# Patient Record
Sex: MALE
[De-identification: names, ages, dates, MRNs, and addresses within clinical notes are randomized per-mention and may not be internally consistent; named-entity substitution may affect disease eponyms.]

---

## 2023-11-21 ENCOUNTER — APPOINTMENT (OUTPATIENT)
Dept: MRI IMAGING | Facility: CLINIC | Age: 28
End: 2023-11-21

## 2023-12-15 ENCOUNTER — APPOINTMENT (OUTPATIENT)
Dept: MRI IMAGING | Facility: CLINIC | Age: 28
End: 2023-12-15

## 2023-12-15 ENCOUNTER — APPOINTMENT (OUTPATIENT)
Dept: ULTRASOUND IMAGING | Facility: CLINIC | Age: 28
End: 2023-12-15

## 2023-12-27 ENCOUNTER — NON-APPOINTMENT (OUTPATIENT)
Age: 28
End: 2023-12-27

## 2023-12-27 ENCOUNTER — APPOINTMENT (OUTPATIENT)
Dept: UROLOGY | Facility: CLINIC | Age: 28
End: 2023-12-27
Payer: COMMERCIAL

## 2023-12-27 VITALS
OXYGEN SATURATION: 98 % | DIASTOLIC BLOOD PRESSURE: 74 MMHG | BODY MASS INDEX: 29.89 KG/M2 | TEMPERATURE: 97.3 F | WEIGHT: 186 LBS | RESPIRATION RATE: 16 BRPM | HEIGHT: 66 IN | SYSTOLIC BLOOD PRESSURE: 123 MMHG | HEART RATE: 87 BPM

## 2023-12-27 DIAGNOSIS — Z82.49 FAMILY HISTORY OF ISCHEMIC HEART DISEASE AND OTHER DISEASES OF THE CIRCULATORY SYSTEM: ICD-10-CM

## 2023-12-27 DIAGNOSIS — Z80.1 FAMILY HISTORY OF MALIGNANT NEOPLASM OF TRACHEA, BRONCHUS AND LUNG: ICD-10-CM

## 2023-12-27 DIAGNOSIS — F32.89 OTHER SPECIFIED DEPRESSIVE EPISODES: ICD-10-CM

## 2023-12-27 DIAGNOSIS — F90.9 ATTENTION-DEFICIT HYPERACTIVITY DISORDER, UNSPECIFIED TYPE: ICD-10-CM

## 2023-12-27 DIAGNOSIS — Z83.3 FAMILY HISTORY OF DIABETES MELLITUS: ICD-10-CM

## 2023-12-27 DIAGNOSIS — Z80.0 FAMILY HISTORY OF MALIGNANT NEOPLASM OF DIGESTIVE ORGANS: ICD-10-CM

## 2023-12-27 DIAGNOSIS — Z83.511 FAMILY HISTORY OF GLAUCOMA: ICD-10-CM

## 2023-12-27 DIAGNOSIS — N48.30 PRIAPISM, UNSPECIFIED: ICD-10-CM

## 2023-12-27 PROCEDURE — 99203 OFFICE O/P NEW LOW 30 MIN: CPT

## 2023-12-27 RX ORDER — BUPROPION HYDROCHLORIDE 300 MG/1
300 TABLET, EXTENDED RELEASE ORAL
Refills: 0 | Status: ACTIVE | COMMUNITY

## 2023-12-27 RX ORDER — SERTRALINE HYDROCHLORIDE 50 MG/1
50 TABLET, FILM COATED ORAL
Refills: 0 | Status: ACTIVE | COMMUNITY

## 2023-12-27 NOTE — HISTORY OF PRESENT ILLNESS
[FreeTextEntry1] : 27yo M presents after 2 episodes of painful erection over last month He states first time he was awaken from sleep with painful erection. He states last painful erection lasted around 3 hours. Even ejaculation didn't resolve the issue. He has been fine since last week He denies taking any otc medication or drugs.

## 2023-12-27 NOTE — ASSESSMENT
[FreeTextEntry1] : Painful erection ?priapism discussed if it recurs, will refer patient to Dr. Mahan for penile duplex US for possible fistula

## 2024-01-23 ENCOUNTER — APPOINTMENT (OUTPATIENT)
Dept: OPHTHALMOLOGY | Facility: CLINIC | Age: 29
End: 2024-01-23
Payer: COMMERCIAL

## 2024-01-23 ENCOUNTER — INPATIENT (INPATIENT)
Facility: HOSPITAL | Age: 29
LOS: 4 days | Discharge: HOME | DRG: 841 | End: 2024-01-28
Attending: STUDENT IN AN ORGANIZED HEALTH CARE EDUCATION/TRAINING PROGRAM | Admitting: SPECIALIST
Payer: COMMERCIAL

## 2024-01-23 ENCOUNTER — NON-APPOINTMENT (OUTPATIENT)
Age: 29
End: 2024-01-23

## 2024-01-23 VITALS
HEIGHT: 67 IN | HEART RATE: 96 BPM | OXYGEN SATURATION: 95 % | WEIGHT: 188.05 LBS | DIASTOLIC BLOOD PRESSURE: 64 MMHG | TEMPERATURE: 98 F | RESPIRATION RATE: 16 BRPM | SYSTOLIC BLOOD PRESSURE: 116 MMHG

## 2024-01-23 PROCEDURE — 92134 CPTRZ OPH DX IMG PST SGM RTA: CPT

## 2024-01-23 PROCEDURE — 99053 MED SERV 10PM-8AM 24 HR FAC: CPT

## 2024-01-23 PROCEDURE — 99291 CRITICAL CARE FIRST HOUR: CPT

## 2024-01-23 PROCEDURE — 99205 OFFICE O/P NEW HI 60 MIN: CPT

## 2024-01-24 ENCOUNTER — RESULT REVIEW (OUTPATIENT)
Age: 29
End: 2024-01-24

## 2024-01-24 DIAGNOSIS — C92.10 CHRONIC MYELOID LEUKEMIA, BCR/ABL-POSITIVE, NOT HAVING ACHIEVED REMISSION: ICD-10-CM

## 2024-01-24 LAB
ALBUMIN SERPL ELPH-MCNC: 3.7 G/DL — SIGNIFICANT CHANGE UP (ref 3.3–5)
ALBUMIN SERPL ELPH-MCNC: 3.8 G/DL — SIGNIFICANT CHANGE UP (ref 3.3–5)
ALBUMIN SERPL ELPH-MCNC: 3.9 G/DL — SIGNIFICANT CHANGE UP (ref 3.3–5)
ALBUMIN SERPL ELPH-MCNC: 3.9 G/DL — SIGNIFICANT CHANGE UP (ref 3.3–5)
ALBUMIN SERPL ELPH-MCNC: 4 G/DL — SIGNIFICANT CHANGE UP (ref 3.3–5)
ALBUMIN SERPL ELPH-MCNC: 4.1 G/DL — SIGNIFICANT CHANGE UP (ref 3.3–5)
ALP SERPL-CCNC: 61 U/L — SIGNIFICANT CHANGE UP (ref 40–120)
ALP SERPL-CCNC: 62 U/L — SIGNIFICANT CHANGE UP (ref 40–120)
ALP SERPL-CCNC: 68 U/L — SIGNIFICANT CHANGE UP (ref 40–120)
ALP SERPL-CCNC: 81 U/L — SIGNIFICANT CHANGE UP (ref 40–120)
ALP SERPL-CCNC: 86 U/L — SIGNIFICANT CHANGE UP (ref 40–120)
ALP SERPL-CCNC: 86 U/L — SIGNIFICANT CHANGE UP (ref 40–120)
ALT FLD-CCNC: 10 U/L — SIGNIFICANT CHANGE UP (ref 10–45)
ALT FLD-CCNC: 13 U/L — SIGNIFICANT CHANGE UP (ref 10–45)
ALT FLD-CCNC: 14 U/L — SIGNIFICANT CHANGE UP (ref 10–45)
ALT FLD-CCNC: 14 U/L — SIGNIFICANT CHANGE UP (ref 10–45)
ANION GAP SERPL CALC-SCNC: 11 MMOL/L — SIGNIFICANT CHANGE UP (ref 5–17)
ANION GAP SERPL CALC-SCNC: 13 MMOL/L — SIGNIFICANT CHANGE UP (ref 5–17)
ANION GAP SERPL CALC-SCNC: 14 MMOL/L — SIGNIFICANT CHANGE UP (ref 5–17)
ANION GAP SERPL CALC-SCNC: 14 MMOL/L — SIGNIFICANT CHANGE UP (ref 5–17)
ANION GAP SERPL CALC-SCNC: 16 MMOL/L — SIGNIFICANT CHANGE UP (ref 5–17)
ANION GAP SERPL CALC-SCNC: 8 MMOL/L — SIGNIFICANT CHANGE UP (ref 5–17)
ANISOCYTOSIS BLD QL: SLIGHT — SIGNIFICANT CHANGE UP
APTT BLD: 25.8 SEC — SIGNIFICANT CHANGE UP (ref 24.5–35.6)
APTT BLD: 26.9 SEC — SIGNIFICANT CHANGE UP (ref 24.5–35.6)
APTT BLD: 27 SEC — SIGNIFICANT CHANGE UP (ref 24.5–35.6)
APTT BLD: 27.2 SEC — SIGNIFICANT CHANGE UP (ref 24.5–35.6)
APTT BLD: 28.9 SEC — SIGNIFICANT CHANGE UP (ref 24.5–35.6)
AST SERPL-CCNC: 17 U/L — SIGNIFICANT CHANGE UP (ref 10–40)
AST SERPL-CCNC: 20 U/L — SIGNIFICANT CHANGE UP (ref 10–40)
AST SERPL-CCNC: 21 U/L — SIGNIFICANT CHANGE UP (ref 10–40)
AST SERPL-CCNC: 21 U/L — SIGNIFICANT CHANGE UP (ref 10–40)
AST SERPL-CCNC: 23 U/L — SIGNIFICANT CHANGE UP (ref 10–40)
AST SERPL-CCNC: 27 U/L — SIGNIFICANT CHANGE UP (ref 10–40)
BASE EXCESS BLDV CALC-SCNC: -0.4 MMOL/L — SIGNIFICANT CHANGE UP (ref -2–3)
BASE EXCESS BLDV CALC-SCNC: 0.4 MMOL/L — SIGNIFICANT CHANGE UP (ref -2–3)
BASE EXCESS BLDV CALC-SCNC: 2 MMOL/L — SIGNIFICANT CHANGE UP (ref -2–3)
BASE EXCESS BLDV CALC-SCNC: 2.6 MMOL/L — SIGNIFICANT CHANGE UP (ref -2–3)
BASOPHILS # BLD AUTO: 11.04 K/UL — HIGH (ref 0–0.2)
BASOPHILS # BLD AUTO: 2.83 K/UL — HIGH (ref 0–0.2)
BASOPHILS # BLD AUTO: 34.49 K/UL — HIGH (ref 0–0.2)
BASOPHILS # BLD AUTO: 5.34 K/UL — HIGH (ref 0–0.2)
BASOPHILS # BLD AUTO: 6.01 K/UL — HIGH (ref 0–0.2)
BASOPHILS NFR BLD AUTO: 1 % — SIGNIFICANT CHANGE UP (ref 0–2)
BASOPHILS NFR BLD AUTO: 10 % — HIGH (ref 0–2)
BASOPHILS NFR BLD AUTO: 2.3 % — HIGH (ref 0–2)
BASOPHILS NFR BLD AUTO: 2.7 % — HIGH (ref 0–2)
BASOPHILS NFR BLD AUTO: 3.3 % — HIGH (ref 0–2)
BILIRUB SERPL-MCNC: 0.2 MG/DL — SIGNIFICANT CHANGE UP (ref 0.2–1.2)
BILIRUB SERPL-MCNC: 0.3 MG/DL — SIGNIFICANT CHANGE UP (ref 0.2–1.2)
BILIRUB SERPL-MCNC: 0.3 MG/DL — SIGNIFICANT CHANGE UP (ref 0.2–1.2)
BILIRUB SERPL-MCNC: 0.4 MG/DL — SIGNIFICANT CHANGE UP (ref 0.2–1.2)
BLASTS # FLD: 2 % — HIGH (ref 0–0)
BLASTS # FLD: 41 % — HIGH (ref 0–0)
BLD GP AB SCN SERPL QL: NEGATIVE — SIGNIFICANT CHANGE UP
BUN SERPL-MCNC: 11 MG/DL — SIGNIFICANT CHANGE UP (ref 7–23)
BUN SERPL-MCNC: 12 MG/DL — SIGNIFICANT CHANGE UP (ref 7–23)
BUN SERPL-MCNC: 13 MG/DL — SIGNIFICANT CHANGE UP (ref 7–23)
BUN SERPL-MCNC: 14 MG/DL — SIGNIFICANT CHANGE UP (ref 7–23)
CA-I BLD-SCNC: 1.14 MMOL/L — LOW (ref 1.15–1.33)
CA-I SERPL-SCNC: 1.21 MMOL/L — SIGNIFICANT CHANGE UP (ref 1.15–1.33)
CA-I SERPL-SCNC: 1.23 MMOL/L — SIGNIFICANT CHANGE UP (ref 1.15–1.33)
CA-I SERPL-SCNC: 1.26 MMOL/L — SIGNIFICANT CHANGE UP (ref 1.15–1.33)
CA-I SERPL-SCNC: 1.27 MMOL/L — SIGNIFICANT CHANGE UP (ref 1.15–1.33)
CALCIUM SERPL-MCNC: 8.7 MG/DL — SIGNIFICANT CHANGE UP (ref 8.4–10.5)
CALCIUM SERPL-MCNC: 8.9 MG/DL — SIGNIFICANT CHANGE UP (ref 8.4–10.5)
CALCIUM SERPL-MCNC: 9 MG/DL — SIGNIFICANT CHANGE UP (ref 8.4–10.5)
CALCIUM SERPL-MCNC: 9 MG/DL — SIGNIFICANT CHANGE UP (ref 8.4–10.5)
CALCIUM SERPL-MCNC: 9.3 MG/DL — SIGNIFICANT CHANGE UP (ref 8.4–10.5)
CALCIUM SERPL-MCNC: 9.3 MG/DL — SIGNIFICANT CHANGE UP (ref 8.4–10.5)
CHLORIDE BLDV-SCNC: 101 MMOL/L — SIGNIFICANT CHANGE UP (ref 96–108)
CHLORIDE BLDV-SCNC: 101 MMOL/L — SIGNIFICANT CHANGE UP (ref 96–108)
CHLORIDE BLDV-SCNC: 105 MMOL/L — SIGNIFICANT CHANGE UP (ref 96–108)
CHLORIDE BLDV-SCNC: 107 MMOL/L — SIGNIFICANT CHANGE UP (ref 96–108)
CHLORIDE SERPL-SCNC: 102 MMOL/L — SIGNIFICANT CHANGE UP (ref 96–108)
CHLORIDE SERPL-SCNC: 103 MMOL/L — SIGNIFICANT CHANGE UP (ref 96–108)
CHLORIDE SERPL-SCNC: 104 MMOL/L — SIGNIFICANT CHANGE UP (ref 96–108)
CHLORIDE SERPL-SCNC: 104 MMOL/L — SIGNIFICANT CHANGE UP (ref 96–108)
CO2 BLDV-SCNC: 26 MMOL/L — SIGNIFICANT CHANGE UP (ref 22–26)
CO2 BLDV-SCNC: 27 MMOL/L — HIGH (ref 22–26)
CO2 BLDV-SCNC: 29 MMOL/L — HIGH (ref 22–26)
CO2 BLDV-SCNC: 30 MMOL/L — HIGH (ref 22–26)
CO2 SERPL-SCNC: 22 MMOL/L — SIGNIFICANT CHANGE UP (ref 22–31)
CO2 SERPL-SCNC: 22 MMOL/L — SIGNIFICANT CHANGE UP (ref 22–31)
CO2 SERPL-SCNC: 26 MMOL/L — SIGNIFICANT CHANGE UP (ref 22–31)
CO2 SERPL-SCNC: 27 MMOL/L — SIGNIFICANT CHANGE UP (ref 22–31)
CO2 SERPL-SCNC: 27 MMOL/L — SIGNIFICANT CHANGE UP (ref 22–31)
CO2 SERPL-SCNC: 28 MMOL/L — SIGNIFICANT CHANGE UP (ref 22–31)
CREAT SERPL-MCNC: 0.88 MG/DL — SIGNIFICANT CHANGE UP (ref 0.5–1.3)
CREAT SERPL-MCNC: 0.9 MG/DL — SIGNIFICANT CHANGE UP (ref 0.5–1.3)
CREAT SERPL-MCNC: 0.9 MG/DL — SIGNIFICANT CHANGE UP (ref 0.5–1.3)
CREAT SERPL-MCNC: 0.91 MG/DL — SIGNIFICANT CHANGE UP (ref 0.5–1.3)
CREAT SERPL-MCNC: 0.94 MG/DL — SIGNIFICANT CHANGE UP (ref 0.5–1.3)
CREAT SERPL-MCNC: 0.97 MG/DL — SIGNIFICANT CHANGE UP (ref 0.5–1.3)
D DIMER BLD IA.RAPID-MCNC: 280 NG/ML DDU — HIGH
D DIMER BLD IA.RAPID-MCNC: 282 NG/ML DDU — HIGH
D DIMER BLD IA.RAPID-MCNC: 809 NG/ML DDU — HIGH
D DIMER BLD IA.RAPID-MCNC: <150 NG/ML DDU — SIGNIFICANT CHANGE UP
D DIMER BLD IA.RAPID-MCNC: <150 NG/ML DDU — SIGNIFICANT CHANGE UP
EGFR: 108 ML/MIN/1.73M2 — SIGNIFICANT CHANGE UP
EGFR: 112 ML/MIN/1.73M2 — SIGNIFICANT CHANGE UP
EGFR: 116 ML/MIN/1.73M2 — SIGNIFICANT CHANGE UP
EGFR: 118 ML/MIN/1.73M2 — SIGNIFICANT CHANGE UP
EGFR: 118 ML/MIN/1.73M2 — SIGNIFICANT CHANGE UP
EGFR: 119 ML/MIN/1.73M2 — SIGNIFICANT CHANGE UP
EOSINOPHIL # BLD AUTO: 17.24 K/UL — HIGH (ref 0–0.5)
EOSINOPHIL # BLD AUTO: 2.83 K/UL — HIGH (ref 0–0.5)
EOSINOPHIL # BLD AUTO: 2.94 K/UL — HIGH (ref 0–0.5)
EOSINOPHIL # BLD AUTO: 3.1 K/UL — HIGH (ref 0–0.5)
EOSINOPHIL # BLD AUTO: 4.76 K/UL — HIGH (ref 0–0.5)
EOSINOPHIL NFR BLD AUTO: 1 % — SIGNIFICANT CHANGE UP (ref 0–6)
EOSINOPHIL NFR BLD AUTO: 1.3 % — SIGNIFICANT CHANGE UP (ref 0–6)
EOSINOPHIL NFR BLD AUTO: 1.3 % — SIGNIFICANT CHANGE UP (ref 0–6)
EOSINOPHIL NFR BLD AUTO: 1.4 % — SIGNIFICANT CHANGE UP (ref 0–6)
EOSINOPHIL NFR BLD AUTO: 5 % — SIGNIFICANT CHANGE UP (ref 0–6)
FERRITIN SERPL-MCNC: 501 NG/ML — HIGH (ref 30–400)
FIBRINOGEN PPP-MCNC: 290 MG/DL — SIGNIFICANT CHANGE UP (ref 200–445)
FIBRINOGEN PPP-MCNC: 319 MG/DL — SIGNIFICANT CHANGE UP (ref 200–445)
FIBRINOGEN PPP-MCNC: 361 MG/DL — SIGNIFICANT CHANGE UP (ref 200–445)
FIBRINOGEN PPP-MCNC: 409 MG/DL — SIGNIFICANT CHANGE UP (ref 200–445)
FIBRINOGEN PPP-MCNC: 481 MG/DL — HIGH (ref 200–445)
FIBRINOGEN PPP-MCNC: 481 MG/DL — HIGH (ref 200–445)
FOLATE SERPL-MCNC: 3.2 NG/ML — LOW
GAS PNL BLDV: 137 MMOL/L — SIGNIFICANT CHANGE UP (ref 136–145)
GAS PNL BLDV: 138 MMOL/L — SIGNIFICANT CHANGE UP (ref 136–145)
GAS PNL BLDV: 139 MMOL/L — SIGNIFICANT CHANGE UP (ref 136–145)
GAS PNL BLDV: 140 MMOL/L — SIGNIFICANT CHANGE UP (ref 136–145)
GAS PNL BLDV: SIGNIFICANT CHANGE UP
GLUCOSE BLDV-MCNC: 139 MG/DL — HIGH (ref 70–99)
GLUCOSE BLDV-MCNC: 152 MG/DL — HIGH (ref 70–99)
GLUCOSE BLDV-MCNC: 168 MG/DL — HIGH (ref 70–99)
GLUCOSE BLDV-MCNC: 205 MG/DL — HIGH (ref 70–99)
GLUCOSE SERPL-MCNC: 103 MG/DL — HIGH (ref 70–99)
GLUCOSE SERPL-MCNC: 109 MG/DL — HIGH (ref 70–99)
GLUCOSE SERPL-MCNC: 112 MG/DL — HIGH (ref 70–99)
GLUCOSE SERPL-MCNC: 136 MG/DL — HIGH (ref 70–99)
GLUCOSE SERPL-MCNC: 84 MG/DL — SIGNIFICANT CHANGE UP (ref 70–99)
GLUCOSE SERPL-MCNC: 98 MG/DL — SIGNIFICANT CHANGE UP (ref 70–99)
HAPTOGLOB SERPL-MCNC: 132 MG/DL — SIGNIFICANT CHANGE UP (ref 34–200)
HAV IGM SER-ACNC: SIGNIFICANT CHANGE UP
HBV CORE IGM SER-ACNC: SIGNIFICANT CHANGE UP
HBV SURFACE AG SER-ACNC: SIGNIFICANT CHANGE UP
HCO3 BLDV-SCNC: 25 MMOL/L — SIGNIFICANT CHANGE UP (ref 22–29)
HCO3 BLDV-SCNC: 25 MMOL/L — SIGNIFICANT CHANGE UP (ref 22–29)
HCO3 BLDV-SCNC: 28 MMOL/L — SIGNIFICANT CHANGE UP (ref 22–29)
HCO3 BLDV-SCNC: 28 MMOL/L — SIGNIFICANT CHANGE UP (ref 22–29)
HCT VFR BLD CALC: 24.4 % — LOW (ref 39–50)
HCT VFR BLD CALC: 24.5 % — LOW (ref 39–50)
HCT VFR BLD CALC: 25.1 % — LOW (ref 39–50)
HCT VFR BLD CALC: 25.1 % — LOW (ref 39–50)
HCT VFR BLD CALC: 25.5 % — LOW (ref 39–50)
HCT VFR BLD CALC: 25.6 % — LOW (ref 39–50)
HCT VFR BLDA CALC: 23 % — LOW (ref 39–51)
HCT VFR BLDA CALC: 24 % — LOW (ref 39–51)
HCT VFR BLDA CALC: 25 % — LOW (ref 39–51)
HCT VFR BLDA CALC: 26 % — LOW (ref 39–51)
HCV AB S/CO SERPL IA: 0.09 S/CO — SIGNIFICANT CHANGE UP (ref 0–0.99)
HCV AB SERPL-IMP: SIGNIFICANT CHANGE UP
HGB BLD CALC-MCNC: 8 G/DL — LOW (ref 12.6–17.4)
HGB BLD CALC-MCNC: 8.7 G/DL — LOW (ref 12.6–17.4)
HGB BLD CALC-MCNC: SIGNIFICANT CHANGE UP G/DL (ref 12.6–17.4)
HGB BLD CALC-MCNC: SIGNIFICANT CHANGE UP G/DL (ref 12.6–17.4)
HGB BLD-MCNC: 8 G/DL — LOW (ref 13–17)
HGB BLD-MCNC: 8.2 G/DL — LOW (ref 13–17)
HGB BLD-MCNC: 8.3 G/DL — LOW (ref 13–17)
HGB BLD-MCNC: 8.4 G/DL — LOW (ref 13–17)
HOROWITZ INDEX BLDV+IHG-RTO: 21 — SIGNIFICANT CHANGE UP
IMM GRANULOCYTES NFR BLD AUTO: 30.9 % — HIGH (ref 0–0.9)
IMM GRANULOCYTES NFR BLD AUTO: 31.4 % — HIGH (ref 0–0.9)
IMM GRANULOCYTES NFR BLD AUTO: 34 % — HIGH (ref 0–0.9)
INR BLD: 1.18 RATIO — SIGNIFICANT CHANGE UP (ref 0.85–1.18)
INR BLD: 1.19 RATIO — HIGH (ref 0.85–1.18)
INR BLD: 1.26 RATIO — HIGH (ref 0.85–1.18)
INR BLD: 1.32 RATIO — HIGH (ref 0.85–1.18)
IRON SATN MFR SERPL: 35 % — SIGNIFICANT CHANGE UP (ref 16–55)
IRON SATN MFR SERPL: 93 UG/DL — SIGNIFICANT CHANGE UP (ref 45–165)
LACTATE BLDV-MCNC: 3.4 MMOL/L — HIGH (ref 0.5–2)
LACTATE BLDV-MCNC: 3.7 MMOL/L — HIGH (ref 0.5–2)
LACTATE BLDV-MCNC: 4.6 MMOL/L — CRITICAL HIGH (ref 0.5–2)
LACTATE BLDV-MCNC: 8.2 MMOL/L — CRITICAL HIGH (ref 0.5–2)
LDH SERPL L TO P-CCNC: 1054 U/L — HIGH (ref 50–242)
LDH SERPL L TO P-CCNC: 1121 U/L — HIGH (ref 50–242)
LDH SERPL L TO P-CCNC: 1200 U/L — HIGH (ref 50–242)
LDH SERPL L TO P-CCNC: 891 U/L — HIGH (ref 50–242)
LDH SERPL L TO P-CCNC: 897 U/L — HIGH (ref 50–242)
LDH SERPL L TO P-CCNC: 935 U/L — HIGH (ref 50–242)
LYMPHOCYTES # BLD AUTO: 1 % — LOW (ref 13–44)
LYMPHOCYTES # BLD AUTO: 16.96 K/UL — HIGH (ref 1–3.3)
LYMPHOCYTES # BLD AUTO: 2.1 % — LOW (ref 13–44)
LYMPHOCYTES # BLD AUTO: 2.5 % — LOW (ref 13–44)
LYMPHOCYTES # BLD AUTO: 2.6 % — LOW (ref 13–44)
LYMPHOCYTES # BLD AUTO: 3.45 K/UL — HIGH (ref 1–3.3)
LYMPHOCYTES # BLD AUTO: 4.85 K/UL — HIGH (ref 1–3.3)
LYMPHOCYTES # BLD AUTO: 5.7 K/UL — HIGH (ref 1–3.3)
LYMPHOCYTES # BLD AUTO: 6 % — LOW (ref 13–44)
LYMPHOCYTES # BLD AUTO: 8.23 K/UL — HIGH (ref 1–3.3)
MAGNESIUM SERPL-MCNC: 1.9 MG/DL — SIGNIFICANT CHANGE UP (ref 1.6–2.6)
MAGNESIUM SERPL-MCNC: 2 MG/DL — SIGNIFICANT CHANGE UP (ref 1.6–2.6)
MAGNESIUM SERPL-MCNC: 2 MG/DL — SIGNIFICANT CHANGE UP (ref 1.6–2.6)
MAGNESIUM SERPL-MCNC: 2.1 MG/DL — SIGNIFICANT CHANGE UP (ref 1.6–2.6)
MANUAL DIF COMMENT BLD-IMP: SIGNIFICANT CHANGE UP
MANUAL SMEAR VERIFICATION: SIGNIFICANT CHANGE UP
MCHC RBC-ENTMCNC: 28.7 PG — SIGNIFICANT CHANGE UP (ref 27–34)
MCHC RBC-ENTMCNC: 29 PG — SIGNIFICANT CHANGE UP (ref 27–34)
MCHC RBC-ENTMCNC: 29 PG — SIGNIFICANT CHANGE UP (ref 27–34)
MCHC RBC-ENTMCNC: 29.1 PG — SIGNIFICANT CHANGE UP (ref 27–34)
MCHC RBC-ENTMCNC: 29.5 PG — SIGNIFICANT CHANGE UP (ref 27–34)
MCHC RBC-ENTMCNC: 30.1 PG — SIGNIFICANT CHANGE UP (ref 27–34)
MCHC RBC-ENTMCNC: 32.4 GM/DL — SIGNIFICANT CHANGE UP (ref 32–36)
MCHC RBC-ENTMCNC: 32.7 GM/DL — SIGNIFICANT CHANGE UP (ref 32–36)
MCHC RBC-ENTMCNC: 32.7 GM/DL — SIGNIFICANT CHANGE UP (ref 32–36)
MCHC RBC-ENTMCNC: 32.9 GM/DL — SIGNIFICANT CHANGE UP (ref 32–36)
MCHC RBC-ENTMCNC: 33.1 GM/DL — SIGNIFICANT CHANGE UP (ref 32–36)
MCHC RBC-ENTMCNC: 34 GM/DL — SIGNIFICANT CHANGE UP (ref 32–36)
MCV RBC AUTO: 87 FL — SIGNIFICANT CHANGE UP (ref 80–100)
MCV RBC AUTO: 87.8 FL — SIGNIFICANT CHANGE UP (ref 80–100)
MCV RBC AUTO: 88.4 FL — SIGNIFICANT CHANGE UP (ref 80–100)
MCV RBC AUTO: 88.7 FL — SIGNIFICANT CHANGE UP (ref 80–100)
MCV RBC AUTO: 89.1 FL — SIGNIFICANT CHANGE UP (ref 80–100)
MCV RBC AUTO: 91.1 FL — SIGNIFICANT CHANGE UP (ref 80–100)
METAMYELOCYTES # FLD: 10 % — HIGH (ref 0–0)
METAMYELOCYTES # FLD: 12 % — HIGH (ref 0–0)
MONOCYTES # BLD AUTO: 10.35 K/UL — HIGH (ref 0–0.9)
MONOCYTES # BLD AUTO: 20.75 K/UL — HIGH (ref 0–0.9)
MONOCYTES # BLD AUTO: 5.65 K/UL — HIGH (ref 0–0.9)
MONOCYTES # BLD AUTO: 7.9 K/UL — HIGH (ref 0–0.9)
MONOCYTES # BLD AUTO: 8.51 K/UL — HIGH (ref 0–0.9)
MONOCYTES NFR BLD AUTO: 2 % — SIGNIFICANT CHANGE UP (ref 2–14)
MONOCYTES NFR BLD AUTO: 3 % — SIGNIFICANT CHANGE UP (ref 2–14)
MONOCYTES NFR BLD AUTO: 3.4 % — SIGNIFICANT CHANGE UP (ref 2–14)
MONOCYTES NFR BLD AUTO: 3.9 % — SIGNIFICANT CHANGE UP (ref 2–14)
MONOCYTES NFR BLD AUTO: 6.2 % — SIGNIFICANT CHANGE UP (ref 2–14)
MYELOCYTES NFR BLD: 17 % — HIGH (ref 0–0)
MYELOCYTES NFR BLD: 2 % — HIGH (ref 0–0)
NEUTROPHILS # BLD AUTO: 128.34 K/UL — HIGH (ref 1.8–7.4)
NEUTROPHILS # BLD AUTO: 137.53 K/UL — HIGH (ref 1.8–7.4)
NEUTROPHILS # BLD AUTO: 141.3 K/UL — HIGH (ref 1.8–7.4)
NEUTROPHILS # BLD AUTO: 175.05 K/UL — HIGH (ref 1.8–7.4)
NEUTROPHILS # BLD AUTO: 86.22 K/UL — HIGH (ref 1.8–7.4)
NEUTROPHILS NFR BLD AUTO: 16 % — LOW (ref 43–77)
NEUTROPHILS NFR BLD AUTO: 35 % — LOW (ref 43–77)
NEUTROPHILS NFR BLD AUTO: 52.6 % — SIGNIFICANT CHANGE UP (ref 43–77)
NEUTROPHILS NFR BLD AUTO: 58.6 % — SIGNIFICANT CHANGE UP (ref 43–77)
NEUTROPHILS NFR BLD AUTO: 59.5 % — SIGNIFICANT CHANGE UP (ref 43–77)
NEUTS BAND # BLD: 15 % — HIGH (ref 0–8)
NEUTS BAND # BLD: 9 % — HIGH (ref 0–8)
NRBC # BLD: 0 /100 WBCS — SIGNIFICANT CHANGE UP (ref 0–0)
NRBC # BLD: 1 /100 WBCS — HIGH (ref 0–0)
OTHER CELLS CSF MANUAL: 7 ML/DL — LOW (ref 18–22)
OVALOCYTES BLD QL SMEAR: SLIGHT — SIGNIFICANT CHANGE UP
PCO2 BLDV: 42 MMHG — SIGNIFICANT CHANGE UP (ref 42–55)
PCO2 BLDV: 43 MMHG — SIGNIFICANT CHANGE UP (ref 42–55)
PCO2 BLDV: 48 MMHG — SIGNIFICANT CHANGE UP (ref 42–55)
PCO2 BLDV: 49 MMHG — SIGNIFICANT CHANGE UP (ref 42–55)
PH BLDV: 7.37 — SIGNIFICANT CHANGE UP (ref 7.32–7.43)
PH BLDV: 7.39 — SIGNIFICANT CHANGE UP (ref 7.32–7.43)
PHOSPHATE SERPL-MCNC: 4 MG/DL — SIGNIFICANT CHANGE UP (ref 2.5–4.5)
PHOSPHATE SERPL-MCNC: 4.2 MG/DL — SIGNIFICANT CHANGE UP (ref 2.5–4.5)
PHOSPHATE SERPL-MCNC: 4.4 MG/DL — SIGNIFICANT CHANGE UP (ref 2.5–4.5)
PHOSPHATE SERPL-MCNC: 4.6 MG/DL — HIGH (ref 2.5–4.5)
PHOSPHATE SERPL-MCNC: 5.4 MG/DL — HIGH (ref 2.5–4.5)
PHOSPHATE SERPL-MCNC: 5.6 MG/DL — HIGH (ref 2.5–4.5)
PLAT MORPH BLD: NORMAL — SIGNIFICANT CHANGE UP
PLAT MORPH BLD: NORMAL — SIGNIFICANT CHANGE UP
PLATELET # BLD AUTO: 161 K/UL — SIGNIFICANT CHANGE UP (ref 150–400)
PLATELET # BLD AUTO: 161 K/UL — SIGNIFICANT CHANGE UP (ref 150–400)
PLATELET # BLD AUTO: 162 K/UL — SIGNIFICANT CHANGE UP (ref 150–400)
PLATELET # BLD AUTO: 206 K/UL — SIGNIFICANT CHANGE UP (ref 150–400)
PLATELET # BLD AUTO: 218 K/UL — SIGNIFICANT CHANGE UP (ref 150–400)
PLATELET # BLD AUTO: 219 K/UL — SIGNIFICANT CHANGE UP (ref 150–400)
PO2 BLDV: 23 MMHG — LOW (ref 25–45)
PO2 BLDV: 39 MMHG — SIGNIFICANT CHANGE UP (ref 25–45)
PO2 BLDV: 40 MMHG — SIGNIFICANT CHANGE UP (ref 25–45)
PO2 BLDV: 46 MMHG — HIGH (ref 25–45)
POIKILOCYTOSIS BLD QL AUTO: SLIGHT — SIGNIFICANT CHANGE UP
POTASSIUM BLDV-SCNC: 3.4 MMOL/L — LOW (ref 3.5–5.1)
POTASSIUM BLDV-SCNC: 3.5 MMOL/L — SIGNIFICANT CHANGE UP (ref 3.5–5.1)
POTASSIUM BLDV-SCNC: 3.5 MMOL/L — SIGNIFICANT CHANGE UP (ref 3.5–5.1)
POTASSIUM BLDV-SCNC: 3.6 MMOL/L — SIGNIFICANT CHANGE UP (ref 3.5–5.1)
POTASSIUM SERPL-MCNC: 3.6 MMOL/L — SIGNIFICANT CHANGE UP (ref 3.5–5.3)
POTASSIUM SERPL-MCNC: 3.6 MMOL/L — SIGNIFICANT CHANGE UP (ref 3.5–5.3)
POTASSIUM SERPL-MCNC: 3.9 MMOL/L — SIGNIFICANT CHANGE UP (ref 3.5–5.3)
POTASSIUM SERPL-MCNC: 4.1 MMOL/L — SIGNIFICANT CHANGE UP (ref 3.5–5.3)
POTASSIUM SERPL-MCNC: 4.2 MMOL/L — SIGNIFICANT CHANGE UP (ref 3.5–5.3)
POTASSIUM SERPL-MCNC: 4.2 MMOL/L — SIGNIFICANT CHANGE UP (ref 3.5–5.3)
POTASSIUM SERPL-SCNC: 3.6 MMOL/L — SIGNIFICANT CHANGE UP (ref 3.5–5.3)
POTASSIUM SERPL-SCNC: 3.6 MMOL/L — SIGNIFICANT CHANGE UP (ref 3.5–5.3)
POTASSIUM SERPL-SCNC: 3.9 MMOL/L — SIGNIFICANT CHANGE UP (ref 3.5–5.3)
POTASSIUM SERPL-SCNC: 4.1 MMOL/L — SIGNIFICANT CHANGE UP (ref 3.5–5.3)
POTASSIUM SERPL-SCNC: 4.2 MMOL/L — SIGNIFICANT CHANGE UP (ref 3.5–5.3)
POTASSIUM SERPL-SCNC: 4.2 MMOL/L — SIGNIFICANT CHANGE UP (ref 3.5–5.3)
PROMYELOCYTES # FLD: 1 % — HIGH (ref 0–0)
PROMYELOCYTES # FLD: 11 % — HIGH (ref 0–0)
PROT SERPL-MCNC: 6.2 G/DL — SIGNIFICANT CHANGE UP (ref 6–8.3)
PROT SERPL-MCNC: 6.3 G/DL — SIGNIFICANT CHANGE UP (ref 6–8.3)
PROT SERPL-MCNC: 6.4 G/DL — SIGNIFICANT CHANGE UP (ref 6–8.3)
PROT SERPL-MCNC: 7 G/DL — SIGNIFICANT CHANGE UP (ref 6–8.3)
PROT SERPL-MCNC: 7.2 G/DL — SIGNIFICANT CHANGE UP (ref 6–8.3)
PROT SERPL-MCNC: 7.2 G/DL — SIGNIFICANT CHANGE UP (ref 6–8.3)
PROTHROM AB SERPL-ACNC: 12.9 SEC — SIGNIFICANT CHANGE UP (ref 9.5–13)
PROTHROM AB SERPL-ACNC: 13 SEC — SIGNIFICANT CHANGE UP (ref 9.5–13)
PROTHROM AB SERPL-ACNC: 13.1 SEC — HIGH (ref 9.5–13)
PROTHROM AB SERPL-ACNC: 13.7 SEC — HIGH (ref 9.5–13)
RBC # BLD: 2.75 M/UL — LOW (ref 4.2–5.8)
RBC # BLD: 2.76 M/UL — LOW (ref 4.2–5.8)
RBC # BLD: 2.81 M/UL — LOW (ref 4.2–5.8)
RBC # BLD: 2.81 M/UL — LOW (ref 4.2–5.8)
RBC # BLD: 2.83 M/UL — LOW (ref 4.2–5.8)
RBC # BLD: 2.86 M/UL — LOW (ref 4.2–5.8)
RBC # BLD: 2.93 M/UL — LOW (ref 4.2–5.8)
RBC # FLD: 18.3 % — HIGH (ref 10.3–14.5)
RBC # FLD: 18.3 % — HIGH (ref 10.3–14.5)
RBC # FLD: 18.4 % — HIGH (ref 10.3–14.5)
RBC # FLD: 18.6 % — HIGH (ref 10.3–14.5)
RBC BLD AUTO: ABNORMAL
RBC BLD AUTO: NORMAL — SIGNIFICANT CHANGE UP
RETICS #: 100.6 K/UL — SIGNIFICANT CHANGE UP (ref 25–125)
RETICS/RBC NFR: 3.6 % — HIGH (ref 0.5–2.5)
RH IG SCN BLD-IMP: POSITIVE — SIGNIFICANT CHANGE UP
SAO2 % BLDV: 37.8 % — LOW (ref 67–88)
SAO2 % BLDV: 63.3 % — LOW (ref 67–88)
SAO2 % BLDV: SIGNIFICANT CHANGE UP % (ref 67–88)
SAO2 % BLDV: SIGNIFICANT CHANGE UP % (ref 67–88)
SCHISTOCYTES BLD QL AUTO: SLIGHT — SIGNIFICANT CHANGE UP
SMUDGE CELLS # BLD: PRESENT — SIGNIFICANT CHANGE UP
SODIUM SERPL-SCNC: 139 MMOL/L — SIGNIFICANT CHANGE UP (ref 135–145)
SODIUM SERPL-SCNC: 139 MMOL/L — SIGNIFICANT CHANGE UP (ref 135–145)
SODIUM SERPL-SCNC: 140 MMOL/L — SIGNIFICANT CHANGE UP (ref 135–145)
SODIUM SERPL-SCNC: 142 MMOL/L — SIGNIFICANT CHANGE UP (ref 135–145)
SODIUM SERPL-SCNC: 142 MMOL/L — SIGNIFICANT CHANGE UP (ref 135–145)
SODIUM SERPL-SCNC: 143 MMOL/L — SIGNIFICANT CHANGE UP (ref 135–145)
TIBC SERPL-MCNC: 270 UG/DL — SIGNIFICANT CHANGE UP (ref 220–430)
UIBC SERPL-MCNC: 177 UG/DL — SIGNIFICANT CHANGE UP (ref 110–370)
URATE SERPL-MCNC: 10.1 MG/DL — HIGH (ref 3.4–8.8)
URATE SERPL-MCNC: 5.6 MG/DL — SIGNIFICANT CHANGE UP (ref 3.4–8.8)
URATE SERPL-MCNC: 5.6 MG/DL — SIGNIFICANT CHANGE UP (ref 3.4–8.8)
URATE SERPL-MCNC: 6.1 MG/DL — SIGNIFICANT CHANGE UP (ref 3.4–8.8)
URATE SERPL-MCNC: 7.7 MG/DL — SIGNIFICANT CHANGE UP (ref 3.4–8.8)
URATE SERPL-MCNC: 9.9 MG/DL — HIGH (ref 3.4–8.8)
VIT B12 SERPL-MCNC: >2000 PG/ML — HIGH (ref 232–1245)
WBC # BLD: 219.25 K/UL — CRITICAL HIGH (ref 3.8–10.5)
WBC # BLD: 231.44 K/UL — CRITICAL HIGH (ref 3.8–10.5)
WBC # BLD: 244.57 K/UL — CRITICAL HIGH (ref 3.8–10.5)
WBC # BLD: 246.49 K/UL — CRITICAL HIGH (ref 3.8–10.5)
WBC # BLD: 282.6 K/UL — CRITICAL HIGH (ref 3.8–10.5)
WBC # BLD: 333.14 K/UL — CRITICAL HIGH (ref 3.8–10.5)
WBC # BLD: 344.88 K/UL — CRITICAL HIGH (ref 3.8–10.5)
WBC # FLD AUTO: 219.25 K/UL — CRITICAL HIGH (ref 3.8–10.5)
WBC # FLD AUTO: 231.44 K/UL — CRITICAL HIGH (ref 3.8–10.5)
WBC # FLD AUTO: 244.57 K/UL — CRITICAL HIGH (ref 3.8–10.5)
WBC # FLD AUTO: 246.49 K/UL — CRITICAL HIGH (ref 3.8–10.5)
WBC # FLD AUTO: 282.6 K/UL — CRITICAL HIGH (ref 3.8–10.5)
WBC # FLD AUTO: 333.14 K/UL — CRITICAL HIGH (ref 3.8–10.5)
WBC # FLD AUTO: 344.88 K/UL — CRITICAL HIGH (ref 3.8–10.5)

## 2024-01-24 PROCEDURE — 88341 IMHCHEM/IMCYTCHM EA ADD ANTB: CPT | Mod: 26,59

## 2024-01-24 PROCEDURE — 76604 US EXAM CHEST: CPT | Mod: 26,GC

## 2024-01-24 PROCEDURE — 88313 SPECIAL STAINS GROUP 2: CPT | Mod: 26

## 2024-01-24 PROCEDURE — 71046 X-RAY EXAM CHEST 2 VIEWS: CPT | Mod: 26

## 2024-01-24 PROCEDURE — 36511 APHERESIS WBC: CPT

## 2024-01-24 PROCEDURE — 88305 TISSUE EXAM BY PATHOLOGIST: CPT | Mod: 26

## 2024-01-24 PROCEDURE — 99223 1ST HOSP IP/OBS HIGH 75: CPT

## 2024-01-24 PROCEDURE — 70450 CT HEAD/BRAIN W/O DYE: CPT | Mod: 26

## 2024-01-24 PROCEDURE — 93308 TTE F-UP OR LMTD: CPT | Mod: 26,GC

## 2024-01-24 PROCEDURE — G0452: CPT | Mod: 26,59

## 2024-01-24 PROCEDURE — 85097 BONE MARROW INTERPRETATION: CPT

## 2024-01-24 PROCEDURE — 85060 BLOOD SMEAR INTERPRETATION: CPT

## 2024-01-24 PROCEDURE — 71045 X-RAY EXAM CHEST 1 VIEW: CPT | Mod: 26,59

## 2024-01-24 PROCEDURE — 76700 US EXAM ABDOM COMPLETE: CPT | Mod: 26

## 2024-01-24 PROCEDURE — 99253 IP/OBS CNSLTJ NEW/EST LOW 45: CPT | Mod: 25

## 2024-01-24 PROCEDURE — 88342 IMHCHEM/IMCYTCHM 1ST ANTB: CPT | Mod: 26,59

## 2024-01-24 PROCEDURE — 76705 ECHO EXAM OF ABDOMEN: CPT | Mod: 26,GC

## 2024-01-24 PROCEDURE — 88291 CYTO/MOLECULAR REPORT: CPT | Mod: 59

## 2024-01-24 PROCEDURE — 99291 CRITICAL CARE FIRST HOUR: CPT | Mod: GC,25

## 2024-01-24 PROCEDURE — 88360 TUMOR IMMUNOHISTOCHEM/MANUAL: CPT | Mod: 26

## 2024-01-24 RX ORDER — ALLOPURINOL 300 MG
300 TABLET ORAL DAILY
Refills: 0 | Status: DISCONTINUED | OUTPATIENT
Start: 2024-01-24 | End: 2024-01-24

## 2024-01-24 RX ORDER — ENOXAPARIN SODIUM 100 MG/ML
40 INJECTION SUBCUTANEOUS EVERY 24 HOURS
Refills: 0 | Status: DISCONTINUED | OUTPATIENT
Start: 2024-01-24 | End: 2024-01-26

## 2024-01-24 RX ORDER — ACETAMINOPHEN 500 MG
650 TABLET ORAL ONCE
Refills: 0 | Status: COMPLETED | OUTPATIENT
Start: 2024-01-24 | End: 2024-01-24

## 2024-01-24 RX ORDER — FOLIC ACID 0.8 MG
1 TABLET ORAL DAILY
Refills: 0 | Status: DISCONTINUED | OUTPATIENT
Start: 2024-01-24 | End: 2024-01-28

## 2024-01-24 RX ORDER — ALLOPURINOL 300 MG
300 TABLET ORAL DAILY
Refills: 0 | Status: DISCONTINUED | OUTPATIENT
Start: 2024-01-25 | End: 2024-01-28

## 2024-01-24 RX ORDER — SERTRALINE 25 MG/1
50 TABLET, FILM COATED ORAL DAILY
Refills: 0 | Status: DISCONTINUED | OUTPATIENT
Start: 2024-01-24 | End: 2024-01-28

## 2024-01-24 RX ORDER — OXYCODONE HYDROCHLORIDE 5 MG/1
5 TABLET ORAL ONCE
Refills: 0 | Status: DISCONTINUED | OUTPATIENT
Start: 2024-01-24 | End: 2024-01-24

## 2024-01-24 RX ORDER — HYDROMORPHONE HYDROCHLORIDE 2 MG/ML
0.5 INJECTION INTRAMUSCULAR; INTRAVENOUS; SUBCUTANEOUS ONCE
Refills: 0 | Status: DISCONTINUED | OUTPATIENT
Start: 2024-01-24 | End: 2024-01-24

## 2024-01-24 RX ORDER — SODIUM CHLORIDE 9 MG/ML
1000 INJECTION INTRAMUSCULAR; INTRAVENOUS; SUBCUTANEOUS
Refills: 0 | Status: DISCONTINUED | OUTPATIENT
Start: 2024-01-24 | End: 2024-01-24

## 2024-01-24 RX ORDER — FENTANYL CITRATE 50 UG/ML
100 INJECTION INTRAVENOUS ONCE
Refills: 0 | Status: DISCONTINUED | OUTPATIENT
Start: 2024-01-24 | End: 2024-01-24

## 2024-01-24 RX ORDER — BUPROPION HYDROCHLORIDE 150 MG/1
300 TABLET, EXTENDED RELEASE ORAL DAILY
Refills: 0 | Status: DISCONTINUED | OUTPATIENT
Start: 2024-01-24 | End: 2024-01-28

## 2024-01-24 RX ORDER — RASBURICASE 7.5 MG
3 KIT INTRAVENOUS ONCE
Refills: 0 | Status: COMPLETED | OUTPATIENT
Start: 2024-01-24 | End: 2024-01-24

## 2024-01-24 RX ORDER — ALLOPURINOL 300 MG
300 TABLET ORAL ONCE
Refills: 0 | Status: COMPLETED | OUTPATIENT
Start: 2024-01-24 | End: 2024-01-24

## 2024-01-24 RX ORDER — SODIUM CHLORIDE 9 MG/ML
10 INJECTION INTRAMUSCULAR; INTRAVENOUS; SUBCUTANEOUS
Refills: 0 | Status: DISCONTINUED | OUTPATIENT
Start: 2024-01-24 | End: 2024-01-28

## 2024-01-24 RX ORDER — HYDROXYUREA 500 MG/1
2000 CAPSULE ORAL EVERY 8 HOURS
Refills: 0 | Status: DISCONTINUED | OUTPATIENT
Start: 2024-01-24 | End: 2024-01-27

## 2024-01-24 RX ORDER — CHLORHEXIDINE GLUCONATE 213 G/1000ML
1 SOLUTION TOPICAL
Refills: 0 | Status: DISCONTINUED | OUTPATIENT
Start: 2024-01-24 | End: 2024-01-28

## 2024-01-24 RX ORDER — SODIUM CHLORIDE 9 MG/ML
1000 INJECTION, SOLUTION INTRAVENOUS
Refills: 0 | Status: DISCONTINUED | OUTPATIENT
Start: 2024-01-24 | End: 2024-01-24

## 2024-01-24 RX ADMIN — BUPROPION HYDROCHLORIDE 300 MILLIGRAM(S): 150 TABLET, EXTENDED RELEASE ORAL at 11:59

## 2024-01-24 RX ADMIN — SODIUM CHLORIDE 100 MILLILITER(S): 9 INJECTION INTRAMUSCULAR; INTRAVENOUS; SUBCUTANEOUS at 02:06

## 2024-01-24 RX ADMIN — OXYCODONE HYDROCHLORIDE 5 MILLIGRAM(S): 5 TABLET ORAL at 16:43

## 2024-01-24 RX ADMIN — CHLORHEXIDINE GLUCONATE 1 APPLICATION(S): 213 SOLUTION TOPICAL at 05:46

## 2024-01-24 RX ADMIN — HYDROXYUREA 2000 MILLIGRAM(S): 500 CAPSULE ORAL at 02:07

## 2024-01-24 RX ADMIN — SODIUM CHLORIDE 100 MILLILITER(S): 9 INJECTION, SOLUTION INTRAVENOUS at 09:00

## 2024-01-24 RX ADMIN — HYDROMORPHONE HYDROCHLORIDE 0.5 MILLIGRAM(S): 2 INJECTION INTRAMUSCULAR; INTRAVENOUS; SUBCUTANEOUS at 05:45

## 2024-01-24 RX ADMIN — ENOXAPARIN SODIUM 40 MILLIGRAM(S): 100 INJECTION SUBCUTANEOUS at 13:54

## 2024-01-24 RX ADMIN — Medication 650 MILLIGRAM(S): at 11:40

## 2024-01-24 RX ADMIN — Medication 1 MILLIGRAM(S): at 03:31

## 2024-01-24 RX ADMIN — OXYCODONE HYDROCHLORIDE 5 MILLIGRAM(S): 5 TABLET ORAL at 16:55

## 2024-01-24 RX ADMIN — RASBURICASE 104 MILLIGRAM(S): KIT at 05:44

## 2024-01-24 RX ADMIN — Medication 650 MILLIGRAM(S): at 20:17

## 2024-01-24 RX ADMIN — HYDROXYUREA 2000 MILLIGRAM(S): 500 CAPSULE ORAL at 05:45

## 2024-01-24 RX ADMIN — Medication 650 MILLIGRAM(S): at 21:00

## 2024-01-24 RX ADMIN — SERTRALINE 50 MILLIGRAM(S): 25 TABLET, FILM COATED ORAL at 11:59

## 2024-01-24 RX ADMIN — HYDROMORPHONE HYDROCHLORIDE 0.5 MILLIGRAM(S): 2 INJECTION INTRAMUSCULAR; INTRAVENOUS; SUBCUTANEOUS at 06:00

## 2024-01-24 RX ADMIN — SODIUM CHLORIDE 100 MILLILITER(S): 9 INJECTION, SOLUTION INTRAVENOUS at 05:45

## 2024-01-24 RX ADMIN — HYDROXYUREA 2000 MILLIGRAM(S): 500 CAPSULE ORAL at 13:54

## 2024-01-24 RX ADMIN — Medication 300 MILLIGRAM(S): at 02:06

## 2024-01-24 RX ADMIN — HYDROXYUREA 2000 MILLIGRAM(S): 500 CAPSULE ORAL at 21:31

## 2024-01-24 RX ADMIN — FENTANYL CITRATE 100 MICROGRAM(S): 50 INJECTION INTRAVENOUS at 04:00

## 2024-01-24 RX ADMIN — FENTANYL CITRATE 100 MICROGRAM(S): 50 INJECTION INTRAVENOUS at 03:41

## 2024-01-24 RX ADMIN — Medication 1 MILLIGRAM(S): at 12:00

## 2024-01-24 RX ADMIN — Medication 650 MILLIGRAM(S): at 12:15

## 2024-01-24 RX ADMIN — Medication 0.5 MILLIGRAM(S): at 16:43

## 2024-01-24 NOTE — CHART NOTE - NSCHARTNOTEFT_GEN_A_CORE
: Raghu Spencer     INDICATION: Critical illness     PROCEDURE:  [X] LIMITED ECHO  [X] LIMITED CHEST  [ ] LIMITED RETROPERITONEAL  [X] LIMITED ABDOMINAL  [ ] LIMITED DVT  [ ] NEEDLE GUIDANCE VASCULAR  [ ] NEEDLE GUIDANCE THORACENTESIS  [ ] NEEDLE GUIDANCE PARACENTESIS  [ ] NEEDLE GUIDANCE PERICARDIOCENTESIS  [ ] OTHER    FINDINGS: LV systolic function intact. LV EF visually within normal limits. LV > RV. IVC 1.3 - 1.7 cm. A-line predominant bilaterally. Splenomegaly.       INTERPRETATION: Splenomegaly in the setting of hematologic malignancy.         Images uploaded to [employer] : Raghu Spencer     INDICATION: Critical illness     PROCEDURE:  [X] LIMITED ECHO  [X] LIMITED CHEST  [ ] LIMITED RETROPERITONEAL  [X] LIMITED ABDOMINAL  [ ] LIMITED DVT  [ ] NEEDLE GUIDANCE VASCULAR  [ ] NEEDLE GUIDANCE THORACENTESIS  [ ] NEEDLE GUIDANCE PARACENTESIS  [ ] NEEDLE GUIDANCE PERICARDIOCENTESIS  [ ] OTHER    FINDINGS: Normal GDE. IVC 1.3 - 1.7 cm. A-line predominant bilaterally. Splenomegaly. Liver span estimated to be 10 cm in mid-clavicular axis.       INTERPRETATION: Splenomegaly in the setting of hematologic malignancy. No cardiac limitation. Normal aeration pattern.         Images uploaded to [employer] : Raghu Spencer     INDICATION: Critical illness     PROCEDURE:  [X] LIMITED ECHO  [X] LIMITED CHEST  [ ] LIMITED RETROPERITONEAL  [X] LIMITED ABDOMINAL  [ ] LIMITED DVT  [ ] NEEDLE GUIDANCE VASCULAR  [ ] NEEDLE GUIDANCE THORACENTESIS  [ ] NEEDLE GUIDANCE PARACENTESIS  [ ] NEEDLE GUIDANCE PERICARDIOCENTESIS  [ ] OTHER    FINDINGS: Normal GDE. IVC 1.3 - 1.7 cm. A-line predominant bilaterally. Splenomegaly. Liver span estimated to be 10 cm in mid-clavicular axis.       INTERPRETATION: Splenomegaly in the setting of hematologic malignancy. No cardiac limitation. Normal aeration pattern.         Images uploaded to [employer]    With Torres LENNON at bedside

## 2024-01-24 NOTE — CONSULT NOTE ADULT - SUBJECTIVE AND OBJECTIVE BOX
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    HPI:  The patient is a 29 y/o M with no significant PMHx who described vision changes over the 1 month and presented to an opthalmology visit (Dr. Aurea Arreola) where he was found to have with diffuse retinal hemorrhages and sent to [facility] ED. The patient endorsed both distant and near vision changes x 1 month with intermittent night sweats, body aches, and hip pain requiring crutches for the past few months. 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 The patient was transferred to [facility].      Male  28y    PAST MEDICAL & SURGICAL HISTORY:  Major depression  No significant past surgical history          MEDICATIONS  (STANDING):  buPROPion XL (24-Hour) . 300 milliGRAM(s) Oral daily  chlorhexidine 4% Liquid 1 Application(s) Topical <User Schedule>  dextrose 5% 1000 milliLiter(s) (100 mL/Hr) IV Continuous <Continuous>  hydroxyurea 2000 milliGRAM(s) Oral every 8 hours  rasburicase IVPB 3 milliGRAM(s) IV Intermittent once  sertraline 50 milliGRAM(s) Oral daily    MEDICATIONS  (PRN):  HYDROmorphone  Injectable 0.5 milliGRAM(s) IV Push once PRN Severe Pain (7 - 10)  sodium chloride 0.9% lock flush 10 milliLiter(s) IV Push every 1 hour PRN Pre/post blood products, medications, blood draw, and to maintain line patency      Social History:  Drinking:   Y(  )   N(  )                Smoking:  Y(  )  N(  )           Marital Status:  Y(  )  N(  )      FAMILY HISTORY:  FH: type 2 diabetes (Father)    FH: prostate cancer (Grandparent)    FH: colon cancer (Uncle)    FHx: lung cancer (Uncle)    FH: gastric cancer (Uncle)          Allergies    No Known Allergies    Intolerances        REVIEW OF SYSTEMS:    CONSTITUTIONAL: No weakness, fevers or chills  EYES/ENT: Decreased vision, worse in R than L  RESPIRATORY: Mild shortness of breath  CARDIOVASCULAR: Mild chest tightness and palpitations  GASTROINTESTINAL: No abdominal or epigastric pain. No nausea, vomiting, or hematemesis; No diarrhea or constipation. No melena or hematochezia.  NEUROLOGICAL: No numbness or weakness  HEMATOLOGY: No anemia, no bleeding  SKIN: No itching, burning, rashes, petechia, or lesions  All other review of systems is negative unless indicated above.    Vital Signs Last 24 Hrs  T(C): 36.4 (24 Jan 2024 04:00), Max: 37.1 (24 Jan 2024 02:25)  T(F): 97.5 (24 Jan 2024 04:00), Max: 98.8 (24 Jan 2024 02:25)  HR: 98 (24 Jan 2024 04:00) (94 - 105)  BP: 131/78 (24 Jan 2024 04:00) (115/77 - 131/78)  BP(mean): 97 (24 Jan 2024 04:00) (89 - 97)  RR: 15 (24 Jan 2024 04:00) (15 - 32)  SpO2: 95% (24 Jan 2024 04:00) (93% - 99%)    Parameters below as of 24 Jan 2024 02:36  Patient On (Oxygen Delivery Method): room air        Daily Height in cm: 167.64 (24 Jan 2024 02:36)    Daily     PHYSICAL EXAM:  Refer to H&P for full physical exam                          8.3    333.14 )-----------( 218      ( 24 Jan 2024 03:12 )             24.4     Reticulocyte Percent: 3.6 % (01-24 @ 01:19)    Hematocrit: 24.4 % (01-24 @ 03:12)  Hematocrit: 25.6 % (01-24 @ 01:19)    01-24    140  |  104  |  11  ----------------------------<  98  3.6   |  22  |  0.90    Ca    9.0      24 Jan 2024 03:12  Phos  4.4     01-24  Mg     2.1     01-24    TPro  7.2  /  Alb  3.9  /  TBili  0.4  /  DBili  x   /  AST  23  /  ALT  14  /  AlkPhos  86  01-24        Lactate Dehydrogenase, Serum: 1121 U/L (01-24 @ 03:12)  Lactate Dehydrogenase, Serum: 1200 U/L (01-24 @ 01:20)      Ferritin: 501 ng/mL (01-24 @ 01:19)      PT/INR - ( 24 Jan 2024 03:12 )   PT: 13.0 sec;   INR: 1.19 ratio         PTT - ( 24 Jan 2024 01:19 )  PTT:26.9 sec                           Patient is a 28y old Male who presents as a transfer from [facility] with a chief complaint of Hyperleukocytosis and diffuse retinal hemorrhage, 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    HPI:  The patient is a 27 y/o M with no significant PMHx who described vision changes over the 1 month and presented to an opthalmology visit (Dr. Aurea Arreola) where he was found to have with diffuse retinal hemorrhages and sent to [facility] ED. The patient endorsed both distant and near vision changes x 1 month with intermittent night sweats, body aches, and hip pain requiring crutches for the past few months. 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 The patient was transferred to [facility].    Patient has increased LDH and uric acid. Transfusion medicine has been consulted to initiate leukocytapheresis in the setting of suspected CML with blast crisis.    Male  28y    PAST MEDICAL & SURGICAL HISTORY:  Major depression  No significant past surgical history          MEDICATIONS  (STANDING):  buPROPion XL (24-Hour) . 300 milliGRAM(s) Oral daily  chlorhexidine 4% Liquid 1 Application(s) Topical <User Schedule>  dextrose 5% 1000 milliLiter(s) (100 mL/Hr) IV Continuous <Continuous>  hydroxyurea 2000 milliGRAM(s) Oral every 8 hours  rasburicase IVPB 3 milliGRAM(s) IV Intermittent once  sertraline 50 milliGRAM(s) Oral daily    MEDICATIONS  (PRN):  HYDROmorphone  Injectable 0.5 milliGRAM(s) IV Push once PRN Severe Pain (7 - 10)  sodium chloride 0.9% lock flush 10 milliLiter(s) IV Push every 1 hour PRN Pre/post blood products, medications, blood draw, and to maintain line patency      Social History:  Drinking:   Y(  )   N(  )                Smoking:  Y(  )  N(  )           Marital Status:  Y(  )  N(  )      FAMILY HISTORY:  FH: type 2 diabetes (Father)    FH: prostate cancer (Grandparent)    FH: colon cancer (Uncle)    FHx: lung cancer (Uncle)    FH: gastric cancer (Uncle)          Allergies    No Known Allergies    Intolerances        REVIEW OF SYSTEMS:    CONSTITUTIONAL: No weakness, fevers or chills  EYES/ENT: Decreased vision, worse in R than L  RESPIRATORY: Mild shortness of breath  CARDIOVASCULAR: Mild chest tightness and palpitations  GASTROINTESTINAL: No abdominal or epigastric pain. No nausea, vomiting, or hematemesis; No diarrhea or constipation. No melena or hematochezia.  NEUROLOGICAL: No numbness or weakness  HEMATOLOGY: No anemia, no bleeding  SKIN: No itching, burning, rashes, petechia, or lesions  All other review of systems is negative unless indicated above.    Vital Signs Last 24 Hrs  T(C): 36.4 (24 Jan 2024 04:00), Max: 37.1 (24 Jan 2024 02:25)  T(F): 97.5 (24 Jan 2024 04:00), Max: 98.8 (24 Jan 2024 02:25)  HR: 98 (24 Jan 2024 04:00) (94 - 105)  BP: 131/78 (24 Jan 2024 04:00) (115/77 - 131/78)  BP(mean): 97 (24 Jan 2024 04:00) (89 - 97)  RR: 15 (24 Jan 2024 04:00) (15 - 32)  SpO2: 95% (24 Jan 2024 04:00) (93% - 99%)    Parameters below as of 24 Jan 2024 02:36  Patient On (Oxygen Delivery Method): room air        Daily Height in cm: 167.64 (24 Jan 2024 02:36)    Daily     PHYSICAL EXAM:  Refer to H&P for full physical exam                          8.3    333.14 )-----------( 218      ( 24 Jan 2024 03:12 )             24.4     Reticulocyte Percent: 3.6 % (01-24 @ 01:19)    Hematocrit: 24.4 % (01-24 @ 03:12)  Hematocrit: 25.6 % (01-24 @ 01:19)    01-24    140  |  104  |  11  ----------------------------<  98  3.6   |  22  |  0.90    Ca    9.0      24 Jan 2024 03:12  Phos  4.4     01-24  Mg     2.1     01-24    TPro  7.2  /  Alb  3.9  /  TBili  0.4  /  DBili  x   /  AST  23  /  ALT  14  /  AlkPhos  86  01-24        Lactate Dehydrogenase, Serum: 1121 U/L (01-24 @ 03:12)  Lactate Dehydrogenase, Serum: 1200 U/L (01-24 @ 01:20)      Ferritin: 501 ng/mL (01-24 @ 01:19)      PT/INR - ( 24 Jan 2024 03:12 )   PT: 13.0 sec;   INR: 1.19 ratio         PTT - ( 24 Jan 2024 01:19 )  PTT:26.9 sec

## 2024-01-24 NOTE — H&P ADULT - NSHPREVIEWOFSYSTEMS_GEN_ALL_CORE
REVIEW OF SYSTEMS:    CONSTITUTIONAL:  + night sweats, body aches.  No weakness, fevers or chills  EYES/ENT: +vision change, No vertigo or throat pain   NECK:  No pain or stiffness  RESPIRATORY:  No cough, wheezing, hemoptysis; No shortness of breath  CARDIOVASCULAR:  No chest pain or palpitations  GASTROINTESTINAL:  No abdominal or epigastric pain. No nausea, vomiting, or hematemesis; No diarrhea or constipation. No melena or hematochezia.  GENITOURINARY:  No dysuria, frequency or hematuria  MUSCULOSKELETAL:  + hip pain, normal strength  NEUROLOGICAL:  No numbness or weakness  SKIN:  No itching, rashes

## 2024-01-24 NOTE — ED PROVIDER NOTE - CRITICAL CARE ATTENDING CONTRIBUTION TO CARE
I, Alexis Solorzano, performed a history and physical exam of the patient and discussed their management with the resident and/or advanced care provider. I reviewed the resident and/or advanced care provider's note and agree with the documented findings and plan of care. I was present and available for all procedures.     see my full note for eval

## 2024-01-24 NOTE — H&P ADULT - NSHPLABSRESULTS_GEN_ALL_CORE
8.3    344.88 )-----------( 219      ( 24 Jan 2024 01:19 )             25.6     01-24    142  |  104  |  11  ----------------------------<  84  4.1   |  22  |  0.90    Ca    8.9      24 Jan 2024 01:20  Phos  4.0     01-24  Mg     2.1     01-24    TPro  7.2  /  Alb  3.9  /  TBili  0.4  /  DBili  x   /  AST  27  /  ALT  13  /  AlkPhos  86  01-24    Blood Gas Profile - Venous (01.24.24 @ 01:31)   pH, Venous: 7.37  pCO2, Venous: 48 mmHg  pO2, Venous: 23 mmHg  HCO3, Venous: 28 mmol/L  Base Excess, Venous: 2.0 mmol/L  Oxygen Saturation, Venous: 37.8 %  Total CO2, Venous: 29 mmol/L    Uric Acid (01.24.24 @ 01:20)   Uric Acid: 9.9 mg/dL  Lactate Dehydrogenase, Serum (01.24.24 @ 01:20)         Urinalysis Basic - ( 24 Jan 2024 01:20 )    Color: x / Appearance: x / SG: x / pH: x  Gluc: 84 mg/dL / Ketone: x  / Bili: x / Urobili: x   Blood: x / Protein: x / Nitrite: x   Leuk Esterase: x / RBC: x / WBC x   Sq Epi: x / Non Sq Epi: x / Bacteria: x    PT/INR - ( 24 Jan 2024 01:19 )   PT: 12.9 sec;   INR: 1.18 ratio    PTT - ( 24 Jan 2024 01:19 )  PTT:26.9 sec    < from: CT Head No Cont (01.24.24 @ 02:00) >    IMPRESSION:  1. Unremarkable, unenhanced CT imaging of the brain for the patient's age.    < end of copied text >

## 2024-01-24 NOTE — PROGRESS NOTE ADULT - SUBJECTIVE AND OBJECTIVE BOX
INTERVAL HPI/OVERNIGHT EVENTS:  Patient S&E at bedside. No complaints at this time. No F/C, CP, SOB, abdominal pain, N/V, rash, or edema      VITAL SIGNS:  T(F): 99 (01-24-24 @ 08:00)  HR: 91 (01-24-24 @ 11:00)  BP: 111/68 (01-24-24 @ 11:00)  RR: 15 (01-24-24 @ 06:00)  SpO2: 94% (01-24-24 @ 11:00)  Wt(kg): --    PHYSICAL EXAM:    Constitutional: NAD  Eyes: EOMI, sclera non-icteric  Neck: supple  Respiratory: no increased WOB, CTAB/L  Cardiovascular: RRR, S1, S2, no m/g/r  Gastrointestinal: soft, NTND, no masses palpable, no HSM  Extremities: no c/c/e  Neurological: AAOx3    MEDICATIONS  (STANDING):  buPROPion XL (24-Hour) . 300 milliGRAM(s) Oral daily  chlorhexidine 4% Liquid 1 Application(s) Topical <User Schedule>  dextrose 5% 1000 milliLiter(s) (100 mL/Hr) IV Continuous <Continuous>  folic acid 1 milliGRAM(s) Oral daily  hydroxyurea 2000 milliGRAM(s) Oral every 8 hours  sertraline 50 milliGRAM(s) Oral daily    MEDICATIONS  (PRN):  sodium chloride 0.9% lock flush 10 milliLiter(s) IV Push every 1 hour PRN Pre/post blood products, medications, blood draw, and to maintain line patency      LABS:                        8.0    282.60 )-----------( 206      ( 24 Jan 2024 09:27 )             24.5     01-24    139  |  103  |  11  ----------------------------<  136<H>  3.6   |  28  |  0.97    Ca    8.7      24 Jan 2024 09:27  Phos  4.6     01-24  Mg     2.1     01-24    TPro  7.0  /  Alb  3.7  /  TBili  0.2  /  DBili  x   /  AST  21  /  ALT  14  /  AlkPhos  81  01-24    PT/INR - ( 24 Jan 2024 09:27 )   PT: 13.1 sec;   INR: 1.26 ratio         PTT - ( 24 Jan 2024 09:27 )  PTT:27.0 sec  Urinalysis Basic - ( 24 Jan 2024 09:27 )    Color: x / Appearance: x / SG: x / pH: x  Gluc: 136 mg/dL / Ketone: x  / Bili: x / Urobili: x   Blood: x / Protein: x / Nitrite: x   Leuk Esterase: x / RBC: x / WBC x   Sq Epi: x / Non Sq Epi: x / Bacteria: x        RADIOLOGY & ADDITIONAL TESTS:   INTERVAL HPI/OVERNIGHT EVENTS:  Patient S&E at bedside. No complaints at this time. No F/C, CP, SOB, abdominal pain, N/V, rash, or edema. Getting leukopharesis      VITAL SIGNS:  T(F): 99 (01-24-24 @ 08:00)  HR: 91 (01-24-24 @ 11:00)  BP: 111/68 (01-24-24 @ 11:00)  RR: 15 (01-24-24 @ 06:00)  SpO2: 94% (01-24-24 @ 11:00)  Wt(kg): --    PHYSICAL EXAM:    Constitutional: NAD  Eyes: EOMI, sclera non-icteric  Neck: supple  Respiratory: no increased WOB, CTAB/L  Cardiovascular: RRR, S1, S2, no m/g/r  Gastrointestinal: soft, NTND, no masses palpable, no HSM  Extremities: no c/c/e  Neurological: AAOx3    MEDICATIONS  (STANDING):  buPROPion XL (24-Hour) . 300 milliGRAM(s) Oral daily  chlorhexidine 4% Liquid 1 Application(s) Topical <User Schedule>  dextrose 5% 1000 milliLiter(s) (100 mL/Hr) IV Continuous <Continuous>  folic acid 1 milliGRAM(s) Oral daily  hydroxyurea 2000 milliGRAM(s) Oral every 8 hours  sertraline 50 milliGRAM(s) Oral daily    MEDICATIONS  (PRN):  sodium chloride 0.9% lock flush 10 milliLiter(s) IV Push every 1 hour PRN Pre/post blood products, medications, blood draw, and to maintain line patency      LABS:                        8.0    282.60 )-----------( 206      ( 24 Jan 2024 09:27 )             24.5     01-24    139  |  103  |  11  ----------------------------<  136<H>  3.6   |  28  |  0.97    Ca    8.7      24 Jan 2024 09:27  Phos  4.6     01-24  Mg     2.1     01-24    TPro  7.0  /  Alb  3.7  /  TBili  0.2  /  DBili  x   /  AST  21  /  ALT  14  /  AlkPhos  81  01-24    PT/INR - ( 24 Jan 2024 09:27 )   PT: 13.1 sec;   INR: 1.26 ratio         PTT - ( 24 Jan 2024 09:27 )  PTT:27.0 sec  Urinalysis Basic - ( 24 Jan 2024 09:27 )    Color: x / Appearance: x / SG: x / pH: x  Gluc: 136 mg/dL / Ketone: x  / Bili: x / Urobili: x   Blood: x / Protein: x / Nitrite: x   Leuk Esterase: x / RBC: x / WBC x   Sq Epi: x / Non Sq Epi: x / Bacteria: x        RADIOLOGY & ADDITIONAL TESTS:

## 2024-01-24 NOTE — CHART NOTE - NSCHARTNOTEFT_GEN_A_CORE
Patient recently seen outpatient by [facility] ophthalmologist Dr. Felicity Arreola. Patient was found to have diffuse retinal hemorrhages and cotton wool spots in both eyes. Based on retinal findings and recent hip pain and night sweats, patient was sent to ER for workup of systemic malignancy.     Ophtho will follow while inpatient    No contraindication for medically necessary anticoagulation from ophtho standpoint.

## 2024-01-24 NOTE — PROGRESS NOTE ADULT - ASSESSMENT
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 Slide notable for left shift. Adequate platelet count. RBCs appear normocytic normochromic, 0-1 schistocyte per HPF.   - Labs are not suggestive of DIC  - Please order STAT:        - CBC with diff, CMP, Ionized calcium, Mg, phos, coags, fibrinogen, D-dimer        - Type and screen        - Iron panel with Ferritin, B-12 and folate level        - Retic count        - LDH/ Uric acid level for TLS labs        - Haptoglobin        - G6PD level        -Hepatitis B SAg, SAb and total core Ab        - BCR-ALB PCR  - If uric acid > 8, give 3mg IV Rasburicase x1; if >12 give 6mg IV x1  - Please start Allopurinol 300mg daily (renally dose)  - Start hydroxyurea 2g q8h.  - Peripheral blood flow cytometry form filled and provided with tubes to MICU team resident for collection. STAT BCR-ABL, PML-LASHAY (much lower suspicion of acute promyelocytic leukemia)  - Check these LABS daily: CBC with diff, CMP, coags, uric acid, LDH, Phos, fibrinogen and d-dimer  - Replete calcium as needed  - Plan of care discussed with attending Dr. Shetty.  - Spoke to blood bank resident Yordan Burk and attending Dr. Alexis regarding leukopheresis because patient has retinal hemorrhages. Patient was approved for leukopheresis. Plan of care discussed with MICU attending Dr. Christianson, and patient will be arranged for bed and central line access.  - Patient will ultimately require BMBx and aspiration in AM.  - Consult ophthalmology for recommendations for vision  - Start IVF hydration at 100 cc/hr x 12 hours, trend TLS labs daily (uric acid, LDH, phosph)  - Check abdominal US to assess splenomegaly  - Hematology team will continue to follow    Note not finalized until signed by attending.   Please do not hesitate to page with questions.     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 Slide notable for left shift. Adequate platelet count. RBCs appear normocytic normochromic, 0-1 schistocyte per HPF.   - Labs are not suggestive of DIC. Normal fibrinogen, elevated D -dimer, LDH 1200, negative Hep panel, normal folate/B12, normal haptoglobin, retic count 3.6%    Plan:  - f/u G6PD level,  Iron panel with Ferritin  - f/u daily: CBC with diff, coags, fibrinogen and d-dimer  - trend TLS labs daily (CMP, uric acid, LDH, phosph)  - If uric acid > 8, give 3mg IV Rasburicase x1; if >12 give 6mg IV x1  - c/w Allopurinol 300mg daily (renally dose)  - c/w IVF hydration at 100 cc/hr x 12 hours  - c/w hydroxyurea 2g q8h.  - f/u Peripheral blood flow cytometry, BCR-ABL, PML-LASHAY (much lower suspicion of acute promyelocytic leukemia)  -c/w leukapheresis today 1/24/24 and tomorrow 1/25/24, with WBC goal <80K  - Patient will ultimately require BMBx and aspiration after peripheral flow results are back  - Consult ophthalmology for recommendations for vision  - Check abdominal US to assess splenomegaly  - Hematology team will continue to follow    Discussed w/ Dr. 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 Slide notable for left shift. Adequate platelet count. RBCs appear normocytic normochromic, 0-1 schistocyte per HPF.   - Labs are not suggestive of DIC. Normal fibrinogen, elevated D -dimer, LDH 1200, negative Hep panel, normal folate/B12, normal haptoglobin, retic count 3.6%    Plan:  - f/u G6PD level,  Iron panel with Ferritin  - f/u daily: CBC with diff, coags, fibrinogen and d-dimer  - trend TLS labs daily (CMP, uric acid, LDH, phosph)  - If uric acid > 8, give 3mg IV Rasburicase x1; if >12 give 6mg IV x1  - c/w Allopurinol 300mg daily (renally dose)  - c/w IVF hydration at 100 cc/hr x 12 hours  - c/w hydroxyurea 2g q8h.  - f/u Peripheral blood flow cytometry, BCR-ABL, PML-LASHAY (much lower suspicion of acute promyelocytic leukemia)  -c/w leukapheresis today 1/24/24 and tomorrow 1/25/24, with WBC goal <80K  - s/p BMBx 1/24/24, f/u path  -start hydrea   -plan to start first dose of dasatinib  - Consult ophthalmology for recommendations for vision  - Check abdominal US to assess splenomegaly  - Hematology team will continue to follow    Discussed w/ Dr. 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 Slide notable for left shift. Adequate platelet count. RBCs appear normocytic normochromic, 0-1 schistocyte per HPF.   - Labs are not suggestive of DIC. Normal fibrinogen, elevated D -dimer, LDH 1200, negative Hep panel, normal folate/B12, normal haptoglobin, retic count 3.6%    Plan:  - f/u G6PD level,  Iron panel with Ferritin  - f/u daily: CBC with diff, coags, fibrinogen and d-dimer  - trend TLS labs daily (CMP, uric acid, LDH, phosph)  - If uric acid > 8, give 3mg IV Rasburicase x1; if >12 give 6mg IV x1  - c/w Allopurinol 300mg daily (renally dose)  - c/w IVF hydration at 100 cc/hr x 12 hours  - c/w hydroxyurea 2g q8h.  - f/u Peripheral blood flow cytometry, BCR-ABL, PML-LASHAY (much lower suspicion of acute promyelocytic leukemia)  -c/w leukapheresis today 1/24/24 and tomorrow 1/25/24, with WBC goal <80K  - s/p BMBx 1/24/24, f/u path  -plan to start first dose of dasatinib  - Consult ophthalmology for recommendations for vision  - Check abdominal US to assess splenomegaly  - Hematology team will continue to follow    Discussed w/ Dr. 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 Slide notable for left shift. Adequate platelet count. RBCs appear normocytic normochromic, 0-1 schistocyte per HPF.   - Labs are not suggestive of DIC. Normal fibrinogen, elevated D -dimer, LDH 1200, negative Hep panel, normal folate/B12, normal haptoglobin, retic count 3.6%    Plan:  - f/u G6PD level,  Iron panel with Ferritin  - f/u daily: CBC with diff, coags, fibrinogen and d-dimer  - trend TLS labs daily (CMP, uric acid, LDH, phosph)  - If uric acid > 8, give 3mg IV Rasburicase x1; if >12 give 6mg IV x1  - c/w Allopurinol 300mg daily (renally dose)  - c/w IVF hydration at 100 cc/hr x 12 hours  - c/w hydroxyurea 2g q8h.  - f/u Peripheral blood flow cytometry, BCR-ABL, PML-LASHAY (much lower suspicion of acute promyelocytic leukemia)  -c/w leukapheresis today 1/24/24 and tomorrow 1/25/24, with WBC goal <80K (given patient is symptomatic w/ retinal hemorrhages 2/2 leukostasis)  - s/p BMBx 1/24/24, f/u path  - Consult ophthalmology for recommendations for vision  - Check abdominal US to assess splenomegaly  - Hematology team will continue to follow    Discussed w/ Dr. Ramya Marcos MD, PhD  Heme/Onc Fellow  PGY4

## 2024-01-24 NOTE — CONSULT NOTE ADULT - ASSESSMENT
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  - .2, Hgb 8.5, Hct 25.3 MCV 89.4, RDW 18.7, plts 224  42% blasts, 22% bands, 6% metamyelocytes, 1% myelocytes, 3 % promyelocytes, neutrophils 16%, basophils 4%, nucleated RBCS 2  INR 1.19, PT 13.3 (mildly elevated), PTT 29.1 (WNL), fibrinogen 468  - Peripheral smear personally reviewed:  - Labs are not suggestive of DIC  - Please order STAT:        - Ionized calcium        - Type and screen        - Iron panel with Ferritin, B-12 and folate level        - Retic count        - LDH/ Uric acid level for TLS labs        - Haptoglobin        - G6PD level        -Hepatitis B SAg, SAb and total core Ab        - BCR-ALB PCR        - Peripheral blood smear for review  - If uric acid > 8, give 3mg IV Rasburicase x1; if >12 give 6mg IV x1  - Please start Allopurinol 300mg daily (renally dose)  - Start hydroxyurea 2g q8h.  - Will request peripheral blood flow cytometry. STAT BCR-ABL, PML-LASHAY requested (lower suspicion of acute promyelocytic leukemia)  - Check these LABS daily: CBC with diff, CMP, coags, uric acid, LDH, Phos, fibrinogen and d-dimer  - Replete calcium as needed  - Plan of care discussed with attending Dr. Shetty.  - Spoke to blood bank resident Yordan Burk and attending Dr. Alexis regarding leukopheresis because patient has retinal hemorrhages. Patient was approved for leukopheresis. Plan of care discussed with MICU attending Dr. Christianson, and patient will be arranged for bed and central line access.  - Patient will ultimately require BMBx and aspiration in AM.  - Consult ophthalmology for recommendations for vision  - Start IVF hydration at 100 cc/hr x 12 hours, trend TLS labs daily (uric acid, LDH, phosph)    Note not finalized until signed by attending.   Please do not hesitate to page with questions.     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  - .2, Hgb 8.5, Hct 25.3 MCV 89.4, RDW 18.7, plts 224  42% blasts, 22% bands, 6% metamyelocytes, 1% myelocytes, 3 % promyelocytes, neutrophils 16%, basophils 4%, nucleated RBCS 2  INR 1.19, PT 13.3 (mildly elevated), PTT 29.1 (WNL), fibrinogen 468  - Peripheral smear personally reviewed:  - Labs are not suggestive of DIC  - Please order STAT:        - CBC with diff, CMP, Ionized calcium, Mg, phos, coags, fibrinogen, D-dimer        - Type and screen        - Iron panel with Ferritin, B-12 and folate level        - Retic count        - LDH/ Uric acid level for TLS labs        - Haptoglobin        - G6PD level        -Hepatitis B SAg, SAb and total core Ab        - BCR-ALB PCR        - Peripheral blood smear for review  - If uric acid > 8, give 3mg IV Rasburicase x1; if >12 give 6mg IV x1  - Please start Allopurinol 300mg daily (renally dose)  - Start hydroxyurea 2g q8h.  - Will request peripheral blood flow cytometry. STAT BCR-ABL, PML-LASHAY requested (lower suspicion of acute promyelocytic leukemia)  - Check these LABS daily: CBC with diff, CMP, coags, uric acid, LDH, Phos, fibrinogen and d-dimer  - Replete calcium as needed  - Plan of care discussed with attending Dr. Shetty.  - Spoke to blood bank resident Yordan Burk and attending Dr. Alexis regarding leukopheresis because patient has retinal hemorrhages. Patient was approved for leukopheresis. Plan of care discussed with MICU attending Dr. Christianson, and patient will be arranged for bed and central line access.  - Patient will ultimately require BMBx and aspiration in AM.  - Consult ophthalmology for recommendations for vision  - Start IVF hydration at 100 cc/hr x 12 hours, trend TLS labs daily (uric acid, LDH, phosph)    Note not finalized until signed by attending.   Please do not hesitate to page with questions.     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 Slide notable for left shift. Adequate platelet count. RBCs appear normocytic normochromic, 0-1 schistocyte per HPF.   - Labs are not suggestive of DIC  - Please order STAT:        - CBC with diff, CMP, Ionized calcium, Mg, phos, coags, fibrinogen, D-dimer        - Type and screen        - Iron panel with Ferritin, B-12 and folate level        - Retic count        - LDH/ Uric acid level for TLS labs        - Haptoglobin        - G6PD level        -Hepatitis B SAg, SAb and total core Ab        - BCR-ALB PCR  - If uric acid > 8, give 3mg IV Rasburicase x1; if >12 give 6mg IV x1  - Please start Allopurinol 300mg daily (renally dose)  - Start hydroxyurea 2g q8h.  - Peripheral blood flow cytometry form filled and provided with tubes to MICU team resident for collection. STAT BCR-ABL, PML-LASHAY (much lower suspicion of acute promyelocytic leukemia)  - Check these LABS daily: CBC with diff, CMP, coags, uric acid, LDH, Phos, fibrinogen and d-dimer  - Replete calcium as needed  - Plan of care discussed with attending Dr. Shetty.  - Spoke to blood bank resident Yordan Burk and attending Dr. Alexis regarding leukopheresis because patient has retinal hemorrhages. Patient was approved for leukopheresis. Plan of care discussed with MICU attending Dr. Christianson, and patient will be arranged for bed and central line access.  - Patient will ultimately require BMBx and aspiration in AM.  - Consult ophthalmology for recommendations for vision  - Start IVF hydration at 100 cc/hr x 12 hours, trend TLS labs daily (uric acid, LDH, phosph)  - Check abdominal US to assess splenomegaly  - Hematology team will continue to follow    Note not finalized until signed by attending.   Please do not hesitate to page with questions.     Yomaira Rodriguez MD PGY5   [phone]  Hematology-Oncology Fellow  WEEKENDS- Please call  to page on-call fellow

## 2024-01-24 NOTE — ED PROVIDER NOTE - CLINICAL SUMMARY MEDICAL DECISION MAKING FREE TEXT BOX
Addended by: TREVON HOPKINS on: 6/12/2020 10:31 AM     Modules accepted: Orders     estrella attending- Patient with new blast crisis will evaluate with screening labs hematology oncology recommendations and admission to the MICU as suggested otherwise close monitoring

## 2024-01-24 NOTE — H&P ADULT - NSHPPHYSICALEXAM_GEN_ALL_CORE
VITALS:   T(C): 36.9 (01-23-24 @ 23:49), Max: 36.9 (01-23-24 @ 23:49)  HR: 96 (01-23-24 @ 23:49) (96 - 96)  BP: 116/64 (01-23-24 @ 23:49) (116/64 - 116/64)  RR: 16 (01-23-24 @ 23:49) (16 - 16)  SpO2: 95% (01-23-24 @ 23:49) (95% - 95%)    GENERAL: NAD, lying in bed comfortably  HEAD:  Atraumatic, Normocephalic  EYES: conjunctiva and sclera clear  ENT: Moist mucous membranes  NECK: Supple, No JVD  CHEST/LUNG: Clear to auscultation bilaterally; No rales, rhonchi, wheezing, or rubs. Unlabored respirations  HEART: Regular rate and rhythm; No murmurs, rubs, or gallops  ABDOMEN: BSx4; Soft, nontender, nondistended  EXTREMITIES:   No clubbing, cyanosis, or edema  NERVOUS SYSTEM:  A&Ox3, no focal deficits   SKIN: No rashes or lesions  Psych: Normal speech, normal behavior, normal affect VITALS:   T(C): 36.9 (01-23-24 @ 23:49), Max: 36.9 (01-23-24 @ 23:49)  HR: 96 (01-23-24 @ 23:49) (96 - 96)  BP: 116/64 (01-23-24 @ 23:49) (116/64 - 116/64)  RR: 16 (01-23-24 @ 23:49) (16 - 16)  SpO2: 95% (01-23-24 @ 23:49) (95% - 95%)    GENERAL: NAD, lying in bed comfortably  HEAD:  Atraumatic, Normocephalic  EYES: conjunctiva and sclera clear  ENT: Moist mucous membranes  NECK: Supple, No JVD  CHEST/LUNG: Clear to auscultation bilaterally; No rales, rhonchi, wheezing, or rubs. Unlabored respirations  HEART: Regular rate and rhythm; No murmurs, rubs, or gallops  ABDOMEN: BSx4; Soft, nontender, nondistended, splenomegaly   EXTREMITIES:   No clubbing, cyanosis, or edema  MSK: tenderness to palpation on the anterior hip joints  NERVOUS SYSTEM:  A&Ox3, no focal deficits   SKIN: No rashes or lesions  Psych: Normal speech, normal behavior, normal affect

## 2024-01-24 NOTE — PROGRESS NOTE ADULT - SUBJECTIVE AND OBJECTIVE BOX
Patient examined and care reviewed in detail on bedside rounds  Critically ill and unstable with leukemia/high wbc count For leukopheresis HemeOnc on close consultation   Frequent bedside visits with therapy change today.   I have personally provided 35+ minutes of critical care time concurrently with the resident/fellow; this excludes time spent on separate procedures.    Pt had goal directed echo within 24 hours of MICU admission  Goals of care discussion had with family within 24 hours of MICU admission  Patient on DVT prophylaxis within 24 hours of MICU admission

## 2024-01-24 NOTE — CONSULT NOTE ADULT - ASSESSMENT
Patient is a 28y old Male who presents as a transfer from [facility] with a chief complaint of Hyperleukocytosis and diffuse retinal hemorrhage, 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    Leukocytapheresis is ordered in the setting of WBC >100 with diffuse retinal hemorrhage.    Plan for daily leukocytapheresis until patient asymptomatic or WBC <100. 2 Blood volumes will be processed during the procedure with 5% albumin PRN.   Patient is a 28 year old Male (previously relatively healthy) who presents as a transfer from [facility] with visual changes over a month, found to have diffuse retinal hemorrhages and hyperleukocytosis 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 Cytoreductive therapy with hydroxyurea has been initiated.    Leukocytapheresis is ordered in the setting of WBC >100 with diffuse retinal hemorrhage.  Plan for daily leukocytapheresis until patient asymptomatic or WBC <100. 2 Blood volumes will be processed during the procedure with 5% albumin replacement fluid.  Informed consent has been obtained.  Shiley catheter has been placed.

## 2024-01-24 NOTE — H&P ADULT - NSICDXFAMILYHX_GEN_ALL_CORE_FT
FAMILY HISTORY:  No pertinent family history in first degree relatives     FAMILY HISTORY:  Father  Still living? Unknown  FH: type 2 diabetes, Age at diagnosis: Age Unknown    Grandparent  Still living? Unknown  FH: prostate cancer, Age at diagnosis: Age Unknown    Uncle  Still living? Unknown  FH: colon cancer, Age at diagnosis: Age Unknown  FH: gastric cancer, Age at diagnosis: Age Unknown  FHx: lung cancer, Age at diagnosis: Age Unknown

## 2024-01-24 NOTE — H&P ADULT - ATTENDING COMMENTS
28M no significant PMHx p/w Ophthalmology Office for Visual Changes x 1 month found Bilateral [place] [facility] [place] notable for Hyperleukocytosis 341K, 42% Blasts suspected underlying CML with Blast Crisis need Emergent Leukophoresis and transferred to [facility].   -  A&O x 3 and FC x 4    - Afebrile and Hemodynamically stable on RAO2   - Bilateral Retinal Hemorrhage evaluation by Ophthalmology Consult and F/U   - Maintenance IV Fluid and Regular Diet as tolerated   - Suspected CML with Blast Crisis started on Hydroxyurea    - HD Catheter placement and Emergent Leukapheresis   - Panculture, Viral Panels, Serial CBC, CMPs, DIC panel, Hemolytic and Tumor Lysis Labs   - Closely monitor I&O and Renal Function for TLS complications   - DVT PPx - SCD   - GOC - Full Code     Patient seen and examined with ICU Resident/Fellow at bedside after lab data, medical records and radiology reports reviewed. I have read and agreeable in general with resident's Documented Note, Assessment and Management Plan which reflected my opinions from bedside round and discussion.   Total Critical Care Time = 45 Min excluding teaching and procedure activity.

## 2024-01-24 NOTE — CONSULT NOTE ADULT - SUBJECTIVE AND OBJECTIVE BOX
HPI:  Patient is a 28yoM, seen at ophthalmologist office today (Dr. Aurea Arreola) with complaint of decreased distant and near vision x 1 month, found to have diffuse retinal hemorrhages, sent directly to the [facility] ED. Patient endorsed having a few months of intermittent night sweats and body aches, hip pain and requiring use of crutches. He was sent to ED.    Home medications:  Adderall XR 15mg  bupropion hydrochloride XL 300mg ER  sertraline 50mg    .2, Hgb 8.5, Hct 25.3 MCV 89.4, RDW 18.7, plts 224  42% blasts, 22% bands, 6% metamyelocytes, 1% myelocytes, 3 % promyelocytes, neutrophils 16%, basophils 4%, nucleated RBCS 2  INR 1.19, PT 13.3 (mildly elevated), PTT 29.1 (WNL), fibrinogen 468    Na 138, K 3.7, Cl 101, HCO3 24, BUN 12, SCr 0.9, Glu 85, Ca 9.3  UA negative.  COVID, RSV, Flu negative.          PAST MEDICAL & SURGICAL HISTORY:  FAMILY HISTORY:  Social History:    REVIEW OF SYSTEMS  CONSTITUTIONAL: No fever, no chills, no fatigue  EYES: No eye pain, no vision changes  ENMT:  No difficulty hearing, no throat pain  RESPIRATORY: No cough,  No shortness of breath  CARDIOVASCULAR: No chest pain, no palpitations  GASTROINTESTINAL: No abdominal pain, no nausea, no vomiting, no diarrhea, no constipation,  GENITOURINARY: No dysuria, no hematuria  NEUROLOGICAL: No numbness , no loss of strength  SKIN: No itching, no rashes,  HEME/LYMPH: No easy bruising, bleeding      >>> <<<>>> <<<  >>> <<<  Allergies  Allergies    No Known Allergies    Intolerances        Medications  MEDICATIONS  (STANDING):    MEDICATIONS  (PRN):      PHYSICAL EXAM:  GENERAL: NAD, well-groomed  HEAD:  Atraumatic, Normocephalic  EYES: EOMI, PERRLA, conjunctiva and sclera clear  ENMT: No oropharyngeal exudates, Moist mucous membranes  NECK: Supple, no cervical lymphadenopathy  NERVOUS SYSTEM:  alert and conversant, moving all extremities spontaneously   CHEST/LUNG: Clear to auscultation bilaterally; no rhonchi  HEART: Regular rate and rhythm; No murmurs  ABDOMEN: Soft, Nontender, Nondistended  EXTREMITIES:  2+ radial Pulses, No cyanosis or edema  SKIN: warm, dry    LABS:                RADIOLOGY & ADDITIONAL STUDIES:  PATHOLOGY:     HPI:  Not yet completed  Patient is a 28yoM, seen at ophthalmologist office today (Dr. Aurea Arreola) with complaint of decreased distant and near vision x 1 month, found to have diffuse retinal hemorrhages, sent directly to the [facility] ED. Patient endorsed having a few months of intermittent night sweats and body aches, hip pain and requiring use of crutches. He was sent to ED.    Home medications:  Adderall XR 15mg  bupropion hydrochloride XL 300mg ER  sertraline 50mg    .2, Hgb 8.5, Hct 25.3 MCV 89.4, RDW 18.7, plts 224  42% blasts, 22% bands, 6% metamyelocytes, 1% myelocytes, 3 % promyelocytes, neutrophils 16%, basophils 4%, nucleated RBCS 2  INR 1.19, PT 13.3 (mildly elevated), PTT 29.1 (WNL), fibrinogen 468    Na 138, K 3.7, Cl 101, HCO3 24, BUN 12, SCr 0.9, Glu 85, Ca 9.3  UA negative.  COVID, RSV, Flu negative.          PAST MEDICAL & SURGICAL HISTORY:  FAMILY HISTORY:  Social History:    REVIEW OF SYSTEMS  CONSTITUTIONAL: No fever, no chills, no fatigue  EYES: No eye pain, no vision changes  ENMT:  No difficulty hearing, no throat pain  RESPIRATORY: No cough,  No shortness of breath  CARDIOVASCULAR: No chest pain, no palpitations  GASTROINTESTINAL: No abdominal pain, no nausea, no vomiting, no diarrhea, no constipation,  GENITOURINARY: No dysuria, no hematuria  NEUROLOGICAL: No numbness , no loss of strength  SKIN: No itching, no rashes,  HEME/LYMPH: No easy bruising, bleeding      >>> <<<>>> <<<  >>> <<<  Allergies  Allergies    No Known Allergies    Intolerances        Medications  MEDICATIONS  (STANDING):    MEDICATIONS  (PRN):      PHYSICAL EXAM:  GENERAL: NAD, well-groomed  HEAD:  Atraumatic, Normocephalic  EYES: EOMI, PERRLA, conjunctiva and sclera clear  ENMT: No oropharyngeal exudates, Moist mucous membranes  NECK: Supple, no cervical lymphadenopathy  NERVOUS SYSTEM:  alert and conversant, moving all extremities spontaneously   CHEST/LUNG: Clear to auscultation bilaterally; no rhonchi  HEART: Regular rate and rhythm; No murmurs  ABDOMEN: Soft, Nontender, Nondistended  EXTREMITIES:  2+ radial Pulses, No cyanosis or edema  SKIN: warm, dry    LABS:                RADIOLOGY & ADDITIONAL STUDIES:  PATHOLOGY:     HPI:  Patient is a 28yoM with PMH of ADHD and depression, seen at ophthalmologist office today (Dr. Aurea Arreola) with complaint of decreased vision x 1.5 month, found to have diffuse retinal hemorrhages, sent directly to the [facility] ED.   Patient noted having floaters, blind spots, and difficult reading with his right eye. He also endorsed night sweats several times a week. He noticed mild dyspnea, not limiting exertion. He reports 3 episodes of priapism in the last 2 weeks. Patient also endorsed body aches, worst at L hip particularly with hip flexion, causing him to use crutches.  He denies fevers, chills, N/V, abdominal pain, rash, bruising, epistaxis, gum bleeding. He had chest tightness and palpitations today which he attributed to anxiety. He reports occasional dizziness, but denies any focal numbness or weakness in extremities.  He does not have a PMD, and has not had blood work in several years.     Home medications, per pt:   Adderall XR 15mg PO qd  Welbutrin XL 300mg ER PO qd  sertraline 50mg PO qd  Adderall short acting 10mg PRN    .2, Hgb 8.5, Hct 25.3 MCV 89.4, RDW 18.7, plts 224  42% blasts, 22% bands, 6% metamyelocytes, 1% myelocytes, 3 % promyelocytes, neutrophils 16%, basophils 4%, nucleated RBCS 2  INR 1.19, PT 13.3 (mildly elevated), PTT 29.1 (WNL), fibrinogen 468    Na 138, K 3.7, Cl 101, HCO3 24, BUN 12, SCr 0.9, Glu 85, Ca 9.3  UA negative.  COVID, RSV, Flu negative.    REVIEW OF SYSTEMS  CONSTITUTIONAL: No fever, no chills, + fatigue  EYES: + decreased vision (right worse than left)  ENMT:  No difficulty hearing, no throat pain  RESPIRATORY: No cough,  + mild shortness of breath  CARDIOVASCULAR: + chest tightness and palpitations  GASTROINTESTINAL: No abdominal pain, no nausea, no vomiting  GENITOURINARY: No dysuria, no hematuria  NEUROLOGICAL: No numbness , no loss of strength  SKIN: No itching, no rashes,  HEME/LYMPH: No easy bruising, bleeding    >>> <<<>>> <<<  >>> <<<  PAST MEDICAL & SURGICAL HISTORY: noted above  2 shoulder surgeries  Allergies: NKDA  FAMILY HISTORY:  maternal grandfather- prostate CA  maternal uncle 1 - oropharyngeal cancer  maternal uncle 2- lung cancer  Social History: Lives with parents, denies tobacco use, has 0-4 servings alcohol weekly, no hx IVDU, works for [employer] at [employer] office    Medications  MEDICATIONS  (STANDING):    MEDICATIONS  (PRN):    PHYSICAL EXAM:  GENERAL: NAD, well-groomed  HEAD:  Atraumatic, Normocephalic  EYES: EOMI, PERRLA, conjunctiva and sclera clear  ENMT: No oropharyngeal exudates, Moist mucous membranes  NECK: Supple, no cervical lymphadenopathy  NERVOUS SYSTEM:  alert and conversant, moving all extremities spontaneously   CHEST/LUNG: Clear to auscultation bilaterally; no rhonchi  HEART: Regular rate and rhythm; No murmurs  ABDOMEN: Soft, Nontender, +enlarged spleen (5 fingers widths below ribs)  EXTREMITIES:  2+ radial Pulses, No cyanosis or edema  SKIN: warm, dry    LABS: pending    RADIOLOGY & ADDITIONAL STUDIES:  PATHOLOGY:  pending

## 2024-01-24 NOTE — ED ADULT NURSE NOTE - OBJECTIVE STATEMENT
Patient is a 28 year old male transferred from [facility] for abnormal lab results. Patient was sent to [facility] after being seen in his ophthalmologist office for decreased distant and near vision x 1 month and was found to have diffuse retinal hemorrhages. 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 On assessment patient is A&Ox4, breathing comfortably on room air, no accessory muscle use, no jvd, no edema, abdomen soft nontender, skin warm and normal for race.

## 2024-01-24 NOTE — ED PROVIDER NOTE - OBJECTIVE STATEMENT
estrella attending- Patient is a 28yoM, seen at ophthalmologist office today (Dr. Aurea Arreola) with complaint of decreased distant and near vision x 1 month, found to have diffuse retinal hemorrhages, sent directly to the [facility] ED. Patient endorsed having a few months of intermittent night sweats and body aches, hip pain and requiring use of crutches. He was sent to ED [place] showing supposed cml with blast crisis.

## 2024-01-24 NOTE — PATIENT PROFILE ADULT - FALL HARM RISK - UNIVERSAL INTERVENTIONS
Bed in lowest position, wheels locked, appropriate side rails in place/Call bell, personal items and telephone in reach/Instruct patient to call for assistance before getting out of bed or chair/Non-slip footwear when patient is out of bed/[place] to call system/Physically safe environment - no spills, clutter or unnecessary equipment/Purposeful Proactive Rounding/Room/bathroom lighting operational, light cord in reach

## 2024-01-24 NOTE — H&P ADULT - ASSESSMENT
This is a 27 y/o M with no significant PMHx who presented to the opthalmology for vision changes x 1 month, found to have b/l retinal hemorrhage on exam and sent to [facility] ED. Workup significant for hyperleukocytosis .2 and 42% blasts, transferred to [facility] MICU for urgent leukopheresis in setting of suspected CML w/ blast crisis.     PLAN  =====Neurologic=====  Patient is AOx4    =====Pulmonary=====  Patient breathing comfortably on room air    =====Cardiovascular=====  Patient does not currently require vasopressors and is normotensive    =====GI=====  #Diet  - regular diet     =====Renal/=====  - no SIMRAN    #Electrolytes  - Maintain K>4, Phos>3, Mag>2, iCal>1    =====Endocrine=====  - no active issues at this time     =====Infectious Disease=====  -low concern for active infection: U/A neg, RVP neg     =====Heme/Onc=====  #Hyperleukocytosis  - suspect CML w/ blast crisis  - .2, 42% blasts, 22% bands, 6% metamyelocytes, 1% myelocytes, 3% promyelocytes   - heme consulted, appreciate recs  - will place central line access for urgent leukopheresis given pt w/ retinal hemorrhages (blood bank approved)  - daily CBC w/ diff, CMP, coags, uric acid, LDH, phos, fibrinogen, ddimer   - consult opthalmology in AM  - continue IVF 100cc/hr x 12 hrs   - BMBx and aspiration in AM  - start hydroxyurea 2g q8h and allopurinol 300mg qd     =====Ethics=====  FULL CODE This is a 29 y/o M with no significant PMHx who presented to the opthalmology for vision changes x 1 month, found to have b/l retinal hemorrhage on exam and sent to [facility] ED. Workup significant for hyperleukocytosis .2 and 42% blasts, transferred to [facility] MICU for emergent leukopheresis in setting of suspected CML w/ blast crisis.     PLAN  =====Neurologic=====  Patient is AOx4    =====Pulmonary=====  Patient breathing comfortably on room air    =====Cardiovascular=====  Patient does not currently require vasopressors and is normotensive    =====GI=====  #Diet  - regular diet     =====Renal/=====  - no SIMRAN    #Electrolytes  - Maintain K>4, Phos>3, Mag>2, iCal>1    =====Endocrine=====  - no active issues at this time     =====Infectious Disease=====  -low concern for active infection: U/A neg, RVP neg     =====Heme/Onc=====  #Hyperleukocytosis  - suspect CML w/ blast crisis  - .2, 42% blasts, 22% bands, 6% metamyelocytes, 1% myelocytes, 3% promyelocytes   - heme consulted, appreciate recs  - will place central line access for urgent leukopheresis given pt w/ retinal hemorrhages (blood bank approved)  - daily CBC w/ diff, CMP, coags, uric acid, LDH, phos, fibrinogen, ddimer   - consult opthalmology in AM  - continue IVF 100cc/hr x 12 hrs   - BMBx and aspiration in AM  - start hydroxyurea 2g q8h and allopurinol 300mg qd     =====Ethics=====  FULL CODE This is a 27 y/o M with no significant PMHx who presented to the opthalmology for vision changes x 1 month, found to have b/l retinal hemorrhage on exam and sent to [facility] ED. Workup significant for hyperleukocytosis .2 and 42% blasts, transferred to [facility] MICU for emergent leukopheresis in setting of suspected CML w/ blast crisis.     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  - .2, 42% blasts, 22% bands, 6% metamyelocytes, 1% myelocytes, 3% promyelocytes   - heme consulted, appreciate recs  - will place central line access for urgent leukopheresis given pt w/ retinal hemorrhages (blood bank approved)  - daily CBC w/ diff, CMP, coags, uric acid, LDH, phos, fibrinogen, ddimer   - consult opthalmology in AM  - continue IVF 100cc/hr x 12 hrs   - BMBx and aspiration in AM  - start hydroxyurea 2g q8h and allopurinol 300mg qd   - rasburicase 3mg if uric acid >8, 6mg if >12   - will order u/s abd to assess for splenomegaly     =====Ethics=====  FULL CODE

## 2024-01-24 NOTE — PROCEDURE NOTE - ADDITIONAL PROCEDURE DETAILS
Hematology/Oncology Procedure Note    Bone Marrow Aspiration/Biopsy    Indication: CML    Bone marrow aspiration and biopsy procedure description, risks, and benefits were discussed in detail with the patient and his family. All questions were answered.  Informed  consent was obtained and time-out performed.      The area of the right posterior iliac crest was prepped and draped using sterile technique. Local anesthetic with  2% Lidocaine.    Bone marrow aspiration and biopsy  was performed using sterile technique Specimens were obtained. No complications and less than 2 cc of blood loss.     The procedure was well tolerated and no local bleeding or other complications were observed.  Pressure was applied to the procedure site and a wound dressing was placed.  The patient and nursing staff were advised that the patient is to lie flat for 30 minutes post procedure and not to shower or change the dressing for 24 hours. Tylenol may be used if no contraindications for pain at the procedure site.

## 2024-01-24 NOTE — H&P ADULT - HISTORY OF PRESENT ILLNESS
This is a 29 y/o M with PMHx of depression and ADHD who presented to the ophthalmologist  This is a 27 y/o M with no significant PMHx who initially resented to an opthalmology visit (Dr. Aurea Arreola) for vision changes x 1 month, found to have diffuse retinal hemorrhages and sent directly to [facility] ED. Patient described both distant and near vision changes x 1 month. Also endorsed intermittent night sweats, body aches, hip pain requiring crutches for the past few months.     In the ED, T 98.1, , /75, RR 17, 99% on RA. Labs significant for .48, Hgb 8.3, uric acid 9.9. Diff significant for 41% blasts, 9% bands, 12% metamyelocytes, 2% myelocytes and 1% promyelocyte. CTH negative.  This is a 29 y/o M with no significant PMHx who initially resented to an opthalmology visit (Dr. Aurea Arreola) for vision changes x 1 month, found to have diffuse retinal hemorrhages and sent directly to [facility] ED. Patient described both distant and near vision changes x 1 month. Also endorsed intermittent night sweats, body aches, hip pain requiring crutches for the past few months.     In the ED, T 98.1, , /75, RR 17, 99% on RA. Labs significant for .48, Hgb 8.3, uric acid 9.9. Diff significant for 41% blasts, 9% bands, 12% metamyelocytes, 2% myelocytes and 1% promyelocyte. CTH negative.     MICU consulted for central line placement for emergent leukopheresis.

## 2024-01-24 NOTE — CONSULT NOTE ADULT - NSCONSULTADDITIONALINFOA_GEN_ALL_CORE
Leukocytapheresis  A procedure in which blood of the patient or the donor is passed typically  through a medical centrifuge which separates out white blood cells (e.g., leukemic blasts or granulocytes), collects the selected cells and returns the remainder of the patient's or the donor's blood with or without addition of replacement fluid such as colloid and/or crystalloid solution. This procedure can be used therapeutically or in preparation of blood components. Leukocytapheresis is a procedure in which blood of the patient or the donor is passed typically  through a medical centrifuge which separates out white blood cells (e.g., leukemic blasts or granulocytes), collects the selected cells and returns the remainder of the patient's or the donor's blood with or without addition of replacement fluid such as colloid and/or crystalloid solution. Leukocytapheresis (including replacement with 5% albumin), benefits, risks and complications of the procedure (citrate toxicity, electrolyte imbalance, fluid imbalance, transfusion reactions which may be life threatening) and alternative options explained in detail to the patient who verbalized understanding and consented to the procedure after all questions answered.     Attestation Statements: I was immediately available for any questions or concerns and fully participated in the care of this patient.  I have made amendments to the documentation where necessary, and agree with the history, physical exam, and plan as documented by the Fellow.

## 2024-01-24 NOTE — ED ADULT NURSE NOTE - NS ED NURSE TRANSPORT WITH
Cardiac Monitor/Defib/ACLS/Rescue Kit/O2/BVM ED TECH / MD DORSEY/Cardiac Monitor/Defib/ACLS/Rescue Kit/O2/BVM/ACLS Rescue Kit

## 2024-01-24 NOTE — CONSULT NOTE ADULT - PRIVATE SPACE
Patient here for C1D8 BCG. Catheter inserted without difficulty. Vitals stable. Denies any issues at this time. He tolerated treatment well and was discharged home in stable condition. He is due to return / for C1D15 BCG. He was advised to turn at home and double flush when he urinates at home. Patient interviewed in a private space.

## 2024-01-24 NOTE — CHART NOTE - NSCHARTNOTEFT_GEN_A_CORE
Patient is a 28 year old Male (previously relatively healthy) who presents as a transfer from [facility] with visual changes over a month, found to have diffuse retinal hemorrhages and hyperleukocytosis 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 Cytoreductive therapy with hydroxyurea has been initiated.    Patient successfully underwent leukocytapheresis today, 01/24/2024. 2 Total blood volumes were processed with 5% albumin as replacement fluid.  Follow up post-procedure CBC.  Patient is tentatively scheduled for day 2 leukocytapheresis on 01/24/2024 pending post-procedure CBC.

## 2024-01-25 LAB
ALBUMIN SERPL ELPH-MCNC: 4.4 G/DL — SIGNIFICANT CHANGE UP (ref 3.3–5)
ALP SERPL-CCNC: 71 U/L — SIGNIFICANT CHANGE UP (ref 40–120)
ALT FLD-CCNC: 11 U/L — SIGNIFICANT CHANGE UP (ref 10–45)
ANION GAP SERPL CALC-SCNC: 12 MMOL/L — SIGNIFICANT CHANGE UP (ref 5–17)
AST SERPL-CCNC: 21 U/L — SIGNIFICANT CHANGE UP (ref 10–40)
BASOPHILS # BLD AUTO: 3.96 K/UL — HIGH (ref 0–0.2)
BASOPHILS NFR BLD AUTO: 2 % — SIGNIFICANT CHANGE UP (ref 0–2)
BCR/ABL BY RT - PCR QUANTITATIVE: SIGNIFICANT CHANGE UP
BILIRUB SERPL-MCNC: 0.4 MG/DL — SIGNIFICANT CHANGE UP (ref 0.2–1.2)
BUN SERPL-MCNC: 13 MG/DL — SIGNIFICANT CHANGE UP (ref 7–23)
CALCIUM SERPL-MCNC: 9.5 MG/DL — SIGNIFICANT CHANGE UP (ref 8.4–10.5)
CHLORIDE SERPL-SCNC: 102 MMOL/L — SIGNIFICANT CHANGE UP (ref 96–108)
CHROM ANALY INTERPHASE BLD FISH-IMP: SIGNIFICANT CHANGE UP
CO2 SERPL-SCNC: 25 MMOL/L — SIGNIFICANT CHANGE UP (ref 22–31)
CREAT SERPL-MCNC: 0.94 MG/DL — SIGNIFICANT CHANGE UP (ref 0.5–1.3)
EGFR: 112 ML/MIN/1.73M2 — SIGNIFICANT CHANGE UP
EOSINOPHIL # BLD AUTO: 1.98 K/UL — HIGH (ref 0–0.5)
EOSINOPHIL NFR BLD AUTO: 1 % — SIGNIFICANT CHANGE UP (ref 0–6)
GLUCOSE SERPL-MCNC: 109 MG/DL — HIGH (ref 70–99)
HBV SURFACE AB SER-ACNC: REACTIVE
HCT VFR BLD CALC: 27.9 % — LOW (ref 39–50)
HGB BLD-MCNC: 9 G/DL — LOW (ref 13–17)
LDH SERPL L TO P-CCNC: 1113 U/L — HIGH (ref 50–242)
LYMPHOCYTES # BLD AUTO: 2 % — LOW (ref 13–44)
LYMPHOCYTES # BLD AUTO: 3.96 K/UL — HIGH (ref 1–3.3)
MAGNESIUM SERPL-MCNC: 2 MG/DL — SIGNIFICANT CHANGE UP (ref 1.6–2.6)
MCHC RBC-ENTMCNC: 28.2 PG — SIGNIFICANT CHANGE UP (ref 27–34)
MCHC RBC-ENTMCNC: 32.3 GM/DL — SIGNIFICANT CHANGE UP (ref 32–36)
MCV RBC AUTO: 87.5 FL — SIGNIFICANT CHANGE UP (ref 80–100)
MONOCYTES # BLD AUTO: 3.96 K/UL — HIGH (ref 0–0.9)
MONOCYTES NFR BLD AUTO: 2 % — SIGNIFICANT CHANGE UP (ref 2–14)
NEUTROPHILS # BLD AUTO: 152.4 K/UL — HIGH (ref 1.8–7.4)
NEUTROPHILS NFR BLD AUTO: 68 % — SIGNIFICANT CHANGE UP (ref 43–77)
PHOSPHATE SERPL-MCNC: 4.8 MG/DL — HIGH (ref 2.5–4.5)
PLATELET # BLD AUTO: 176 K/UL — SIGNIFICANT CHANGE UP (ref 150–400)
POTASSIUM SERPL-MCNC: 4.1 MMOL/L — SIGNIFICANT CHANGE UP (ref 3.5–5.3)
POTASSIUM SERPL-SCNC: 4.1 MMOL/L — SIGNIFICANT CHANGE UP (ref 3.5–5.3)
PROT SERPL-MCNC: 7.3 G/DL — SIGNIFICANT CHANGE UP (ref 6–8.3)
RBC # BLD: 3.19 M/UL — LOW (ref 4.2–5.8)
RBC # FLD: 18.2 % — HIGH (ref 10.3–14.5)
SODIUM SERPL-SCNC: 139 MMOL/L — SIGNIFICANT CHANGE UP (ref 135–145)
URATE SERPL-MCNC: 5.8 MG/DL — SIGNIFICANT CHANGE UP (ref 3.4–8.8)
WBC # BLD: 197.92 K/UL — CRITICAL HIGH (ref 3.8–10.5)
WBC # FLD AUTO: 197.92 K/UL — CRITICAL HIGH (ref 3.8–10.5)

## 2024-01-25 PROCEDURE — 99232 SBSQ HOSP IP/OBS MODERATE 35: CPT

## 2024-01-25 PROCEDURE — 99232 SBSQ HOSP IP/OBS MODERATE 35: CPT | Mod: GC

## 2024-01-25 RX ORDER — HYDROMORPHONE HYDROCHLORIDE 2 MG/ML
0.5 INJECTION INTRAMUSCULAR; INTRAVENOUS; SUBCUTANEOUS ONCE
Refills: 0 | Status: DISCONTINUED | OUTPATIENT
Start: 2024-01-25 | End: 2024-01-25

## 2024-01-25 RX ORDER — LIDOCAINE 4 G/100G
1 CREAM TOPICAL EVERY 24 HOURS
Refills: 0 | Status: DISCONTINUED | OUTPATIENT
Start: 2024-01-25 | End: 2024-01-28

## 2024-01-25 RX ORDER — SODIUM CHLORIDE 9 MG/ML
1000 INJECTION, SOLUTION INTRAVENOUS
Refills: 0 | Status: DISCONTINUED | OUTPATIENT
Start: 2024-01-25 | End: 2024-01-26

## 2024-01-25 RX ORDER — OXYCODONE HYDROCHLORIDE 5 MG/1
5 TABLET ORAL ONCE
Refills: 0 | Status: DISCONTINUED | OUTPATIENT
Start: 2024-01-25 | End: 2024-01-25

## 2024-01-25 RX ORDER — LIDOCAINE 4 G/100G
1 CREAM TOPICAL DAILY
Refills: 0 | Status: DISCONTINUED | OUTPATIENT
Start: 2024-01-25 | End: 2024-01-28

## 2024-01-25 RX ORDER — MORPHINE SULFATE 50 MG/1
2 CAPSULE, EXTENDED RELEASE ORAL EVERY 4 HOURS
Refills: 0 | Status: DISCONTINUED | OUTPATIENT
Start: 2024-01-25 | End: 2024-01-25

## 2024-01-25 RX ORDER — LORATADINE 10 MG/1
10 TABLET ORAL DAILY
Refills: 0 | Status: DISCONTINUED | OUTPATIENT
Start: 2024-01-25 | End: 2024-01-28

## 2024-01-25 RX ORDER — DASATINIB 80 MG/1
1 TABLET ORAL
Qty: 30 | Refills: 6
Start: 2024-01-25 | End: 2024-08-21

## 2024-01-25 RX ORDER — DASATINIB 80 MG/1
100 TABLET ORAL DAILY
Refills: 0 | Status: DISCONTINUED | OUTPATIENT
Start: 2024-01-25 | End: 2024-01-28

## 2024-01-25 RX ORDER — HYDROMORPHONE HYDROCHLORIDE 2 MG/ML
0.5 INJECTION INTRAMUSCULAR; INTRAVENOUS; SUBCUTANEOUS EVERY 4 HOURS
Refills: 0 | Status: DISCONTINUED | OUTPATIENT
Start: 2024-01-25 | End: 2024-01-28

## 2024-01-25 RX ADMIN — HYDROMORPHONE HYDROCHLORIDE 0.5 MILLIGRAM(S): 2 INJECTION INTRAMUSCULAR; INTRAVENOUS; SUBCUTANEOUS at 09:57

## 2024-01-25 RX ADMIN — OXYCODONE HYDROCHLORIDE 5 MILLIGRAM(S): 5 TABLET ORAL at 07:08

## 2024-01-25 RX ADMIN — OXYCODONE HYDROCHLORIDE 5 MILLIGRAM(S): 5 TABLET ORAL at 22:47

## 2024-01-25 RX ADMIN — MORPHINE SULFATE 2 MILLIGRAM(S): 50 CAPSULE, EXTENDED RELEASE ORAL at 09:19

## 2024-01-25 RX ADMIN — OXYCODONE HYDROCHLORIDE 5 MILLIGRAM(S): 5 TABLET ORAL at 21:47

## 2024-01-25 RX ADMIN — HYDROMORPHONE HYDROCHLORIDE 0.5 MILLIGRAM(S): 2 INJECTION INTRAMUSCULAR; INTRAVENOUS; SUBCUTANEOUS at 23:13

## 2024-01-25 RX ADMIN — HYDROXYUREA 2000 MILLIGRAM(S): 500 CAPSULE ORAL at 21:48

## 2024-01-25 RX ADMIN — OXYCODONE HYDROCHLORIDE 5 MILLIGRAM(S): 5 TABLET ORAL at 06:53

## 2024-01-25 RX ADMIN — CHLORHEXIDINE GLUCONATE 1 APPLICATION(S): 213 SOLUTION TOPICAL at 06:04

## 2024-01-25 RX ADMIN — SERTRALINE 50 MILLIGRAM(S): 25 TABLET, FILM COATED ORAL at 11:35

## 2024-01-25 RX ADMIN — SODIUM CHLORIDE 100 MILLILITER(S): 9 INJECTION, SOLUTION INTRAVENOUS at 09:57

## 2024-01-25 RX ADMIN — HYDROXYUREA 2000 MILLIGRAM(S): 500 CAPSULE ORAL at 13:09

## 2024-01-25 RX ADMIN — LIDOCAINE 1 PATCH: 4 CREAM TOPICAL at 15:20

## 2024-01-25 RX ADMIN — Medication 1 MILLIGRAM(S): at 11:35

## 2024-01-25 RX ADMIN — BUPROPION HYDROCHLORIDE 300 MILLIGRAM(S): 150 TABLET, EXTENDED RELEASE ORAL at 11:35

## 2024-01-25 RX ADMIN — DASATINIB 100 MILLIGRAM(S): 80 TABLET ORAL at 14:14

## 2024-01-25 RX ADMIN — HYDROXYUREA 2000 MILLIGRAM(S): 500 CAPSULE ORAL at 06:04

## 2024-01-25 RX ADMIN — MORPHINE SULFATE 2 MILLIGRAM(S): 50 CAPSULE, EXTENDED RELEASE ORAL at 09:04

## 2024-01-25 RX ADMIN — HYDROMORPHONE HYDROCHLORIDE 0.5 MILLIGRAM(S): 2 INJECTION INTRAMUSCULAR; INTRAVENOUS; SUBCUTANEOUS at 10:12

## 2024-01-25 RX ADMIN — HYDROMORPHONE HYDROCHLORIDE 0.5 MILLIGRAM(S): 2 INJECTION INTRAMUSCULAR; INTRAVENOUS; SUBCUTANEOUS at 19:37

## 2024-01-25 RX ADMIN — HYDROMORPHONE HYDROCHLORIDE 0.5 MILLIGRAM(S): 2 INJECTION INTRAMUSCULAR; INTRAVENOUS; SUBCUTANEOUS at 16:10

## 2024-01-25 RX ADMIN — HYDROMORPHONE HYDROCHLORIDE 0.5 MILLIGRAM(S): 2 INJECTION INTRAMUSCULAR; INTRAVENOUS; SUBCUTANEOUS at 15:20

## 2024-01-25 RX ADMIN — LORATADINE 10 MILLIGRAM(S): 10 TABLET ORAL at 15:20

## 2024-01-25 RX ADMIN — Medication 300 MILLIGRAM(S): at 11:35

## 2024-01-25 RX ADMIN — LIDOCAINE 1 PATCH: 4 CREAM TOPICAL at 19:30

## 2024-01-25 RX ADMIN — HYDROMORPHONE HYDROCHLORIDE 0.5 MILLIGRAM(S): 2 INJECTION INTRAMUSCULAR; INTRAVENOUS; SUBCUTANEOUS at 18:37

## 2024-01-25 RX ADMIN — ENOXAPARIN SODIUM 40 MILLIGRAM(S): 100 INJECTION SUBCUTANEOUS at 13:09

## 2024-01-25 RX ADMIN — LIDOCAINE 1 PATCH: 4 CREAM TOPICAL at 16:02

## 2024-01-25 NOTE — PHYSICAL THERAPY INITIAL EVALUATION ADULT - PERTINENT HX OF CURRENT PROBLEM, REHAB EVAL
27 y/o M with no significant PMHx who presented to the opthalmology for vision changes x 1 month, found to have b/l retinal hemorrhage on exam and sent to [facility] ED. Workup significant for hyperleukocytosis .2 and 42% blasts, transferred to [facility] MICU for emergent leukopheresis in setting of suspected CML w/ blast crisis.

## 2024-01-25 NOTE — PROGRESS NOTE ADULT - SUBJECTIVE AND OBJECTIVE BOX
Patient is a 28y old  Male who presents with a chief complaint of Hyperleukocytosis (24 Jan 2024 17:36)      SUBJECTIVE / OVERNIGHT EVENTS:  Patient seen and evaluated at bedside.    Denies any fevers, chills, CP, or SOB.    Vital Signs Last 24 Hrs  T(C): 36.7 (25 Jan 2024 04:00), Max: 37.2 (24 Jan 2024 08:00)  T(F): 98.1 (25 Jan 2024 04:00), Max: 99 (24 Jan 2024 08:00)  HR: 87 (25 Jan 2024 06:10) (1 - 107)  BP: 118/51 (25 Jan 2024 06:00) (99/55 - 135/82)  BP(mean): 77 (25 Jan 2024 06:00) (72 - 103)  RR: 21 (25 Jan 2024 06:00) (16 - 24)  SpO2: 93% (25 Jan 2024 06:10) (93% - 99%)    Parameters below as of 24 Jan 2024 20:00  Patient On (Oxygen Delivery Method): room air        PHYSICAL EXAM:  GENERAL: NAD, well-developed  CHEST/LUNG: Clear to auscultation bilaterally; No wheeze  HEART: Regular rate and rhythm; Normal S1 S2, No murmurs, rubs, or gallops  ABDOMEN: Soft, Nontender, Nondistended; Bowel sounds present  EXTREMITIES:  2+ Peripheral Pulses, No clubbing, cyanosis, or edema  PSYCH: AAOx3    LABS:                        8.4    219.25 )-----------( 161      ( 24 Jan 2024 22:07 )             25.5     Hgb Trend: 8.4<--, 8.3<--, 8.2<--, 8.0<--, 8.3<--  01-24    139  |  102  |  14  ----------------------------<  109<H>  4.2   |  26  |  0.88    Ca    9.0      24 Jan 2024 22:07  Phos  5.6     01-24  Mg     2.0     01-24    TPro  6.4  /  Alb  4.0  /  TBili  0.3  /  DBili  x   /  AST  20  /  ALT  10  /  AlkPhos  62  01-24    Creatinine Trend: 0.88<--, 0.94<--, 0.91<--, 0.97<--, 0.90<--, 0.90<--  LIVER FUNCTIONS - ( 24 Jan 2024 22:07 )  Alb: 4.0 g/dL / Pro: 6.4 g/dL / ALK PHOS: 62 U/L / ALT: 10 U/L / AST: 20 U/L / GGT: x           PT/INR - ( 24 Jan 2024 22:07 )   PT: 13.1 sec;   INR: 1.26 ratio         PTT - ( 24 Jan 2024 22:07 )  PTT:27.2 sec      Urinalysis Basic - ( 24 Jan 2024 22:07 )    Color: x / Appearance: x / SG: x / pH: x  Gluc: 109 mg/dL / Ketone: x  / Bili: x / Urobili: x   Blood: x / Protein: x / Nitrite: x   Leuk Esterase: x / RBC: x / WBC x   Sq Epi: x / Non Sq Epi: x / Bacteria: x     Patient is a 28y old  Male who presents with a chief complaint of Hyperleukocytosis (24 Jan 2024 17:36)      SUBJECTIVE / OVERNIGHT EVENTS:  Patient seen and evaluated at bedside.  No events overnight. Patient tolerated session of leukopheresis yesterday and BMB.     Vital Signs Last 24 Hrs  T(C): 36.7 (25 Jan 2024 04:00), Max: 37.2 (24 Jan 2024 08:00)  T(F): 98.1 (25 Jan 2024 04:00), Max: 99 (24 Jan 2024 08:00)  HR: 87 (25 Jan 2024 06:10) (1 - 107)  BP: 118/51 (25 Jan 2024 06:00) (99/55 - 135/82)  BP(mean): 77 (25 Jan 2024 06:00) (72 - 103)  RR: 21 (25 Jan 2024 06:00) (16 - 24)  SpO2: 93% (25 Jan 2024 06:10) (93% - 99%)    Parameters below as of 24 Jan 2024 20:00  Patient On (Oxygen Delivery Method): room air        PHYSICAL EXAM:  GENERAL: NAD, well-developed  CHEST/LUNG: Clear to auscultation bilaterally; No wheeze  HEART: Regular rate and rhythm; Normal S1 S2, No murmurs, rubs, or gallops  ABDOMEN: Soft, Nontender, Nondistended; Bowel sounds present  EXTREMITIES:  2+ Peripheral Pulses, No clubbing, cyanosis, or edema  PSYCH: AAOx3    LABS:                        8.4    219.25 )-----------( 161      ( 24 Jan 2024 22:07 )             25.5     Hgb Trend: 8.4<--, 8.3<--, 8.2<--, 8.0<--, 8.3<--  01-24    139  |  102  |  14  ----------------------------<  109<H>  4.2   |  26  |  0.88    Ca    9.0      24 Jan 2024 22:07  Phos  5.6     01-24  Mg     2.0     01-24    TPro  6.4  /  Alb  4.0  /  TBili  0.3  /  DBili  x   /  AST  20  /  ALT  10  /  AlkPhos  62  01-24    Creatinine Trend: 0.88<--, 0.94<--, 0.91<--, 0.97<--, 0.90<--, 0.90<--  LIVER FUNCTIONS - ( 24 Jan 2024 22:07 )  Alb: 4.0 g/dL / Pro: 6.4 g/dL / ALK PHOS: 62 U/L / ALT: 10 U/L / AST: 20 U/L / GGT: x           PT/INR - ( 24 Jan 2024 22:07 )   PT: 13.1 sec;   INR: 1.26 ratio         PTT - ( 24 Jan 2024 22:07 )  PTT:27.2 sec      Urinalysis Basic - ( 24 Jan 2024 22:07 )    Color: x / Appearance: x / SG: x / pH: x  Gluc: 109 mg/dL / Ketone: x  / Bili: x / Urobili: x   Blood: x / Protein: x / Nitrite: x   Leuk Esterase: x / RBC: x / WBC x   Sq Epi: x / Non Sq Epi: x / Bacteria: x

## 2024-01-25 NOTE — PHYSICAL THERAPY INITIAL EVALUATION ADULT - ADDITIONAL COMMENTS
as per pt: PTA pt was living in a  1st floor set up and was independent in all functional mobility and ADL's. no AD for gait. recent crutches for hip pain. however moving on his own.

## 2024-01-25 NOTE — PROGRESS NOTE ADULT - ASSESSMENT
27 y/o M with no significant PMHx who presented to the opthalmology for vision changes x 1 month, found to have b/l retinal hemorrhage on exam and sent to [facility] ED. 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  - .2, 42% blasts, 22% bands, 6% metamyelocytes, 1% myelocytes, 3% promyelocytes   - heme consulted, appreciate recs  - will place central line access for urgent leukopheresis given pt w/ retinal hemorrhages (blood bank approved)  - daily CBC w/ diff, CMP, coags, uric acid, LDH, phos, fibrinogen, ddimer   - consult opthalmology in AM  - continue IVF 100cc/hr x 12 hrs   - BMBx and aspiration in AM  - start hydroxyurea 2g q8h and allopurinol 300mg qd   - rasburicase 3mg if uric acid >8, 6mg if >12   - US abdomen with evidence of splenomegaly    =====Ethics=====  FULL CODE 29 y/o M DANIELA on CPAP and anxiety who presented to the opthalmology for vision changes x 1 month, found to have b/l retinal hemorrhage on exam and sent to [facility] ED. 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 s/p leukopheresis and BMB with improvement in white count.    PLAN  =====Neurologic=====  #Anxiety  - c/w home bupoprion and sertraline    #Retinal Hemorrhage  - evaluated by ophthalmology with no intervention required     =====Pulmonary=====  #DANIELA on CPAP  - c/w CPAP at night     =====Cardiovascular=====  - no active issues     =====GI=====  #Diet  - regular diet     =====Renal/=====  - no SIMRAN    #Electrolytes  - Maintain K>4, Phos>3, Mag>2, iCal>1    =====Endocrine=====  - no active issues at this time     =====Infectious Disease=====  #Leukocytosis  - likely iso CML process  - low concern for active infection  - U/A neg, RVP neg   - monitor off antibiotics     =====Heme/Onc=====  #Hyperleukocytosis c/f CML  #c/f Leukemia   - 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

## 2024-01-25 NOTE — PROGRESS NOTE ADULT - ASSESSMENT
29yo gentleman with PMH of ADHD and depression p/w night sweats x 1 year, unintentional weight loss x6 mo, decreased vision x 1.5 months, found to have hyperleukocytosis 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 Slide notable for left shift. Adequate platelet count. RBCs appear normocytic normochromic, 0-1 schistocyte per HPF.   - Labs are not suggestive of DIC. Normal fibrinogen, elevated D -dimer, LDH 1200, negative Hep panel, normal folate/B12, normal haptoglobin, retic count 3.6%    Plan:  - f/u G6PD level,  Iron panel with Ferritin  - f/u daily: CBC with diff, coags, fibrinogen and d-dimer  - trend TLS labs daily (CMP, uric acid, LDH, phosph)  - If uric acid > 8, give 3mg IV Rasburicase x1; if >12 give 6mg IV x1  - c/w Allopurinol 300mg daily (renally dose)  - c/w IVF hydration at 100 cc/hr x 12 hours  - c/w hydroxyurea 2g q8h.  - f/u Peripheral blood flow cytometry, BCR-ABL, PML-LASHAY   -s/p leukapheresis 1/24/24. 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  - s/p BMBx 1/24/24, f/u path  - plan to start Dasatinib 100mg 1/25/24  - Consult ophthalmology for recommendations for vision  - Check abdominal US to assess splenomegaly  - patient to be transferred to [address] [place] [address] for further management    Discussed w/ Dr. Bui and Dr. Shine Marcos MD, PhD  Heme/Onc Fellow  PGY4   27yo gentleman with PMH of ADHD and depression p/w night sweats x 1 year, unintentional weight loss x6 mo, decreased vision x 1.5 months, found to have hyperleukocytosis 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     # Suspected CML   #Leukostasis  - .2, Hgb 8.5, Hct 25.3 MCV 89.4, RDW 18.7, plts 224  42% blasts, 22% bands, 6% metamyelocytes, 1% myelocytes, 3 % promyelocytes, neutrophils 16%, basophils 4%, nucleated RBCS 2  INR 1.19, PT 13.3 (mildly elevated), PTT 29.1 (WNL), fibrinogen 468  - Peripheral smear personally reviewed 1/24/24: leukocytosis noted, blasts seen with large N:C ratio and open chromatin, increased population of band cells, eosinophils, and basophils. Slide notable for left shift. Adequate platelet count. RBCs appear normocytic normochromic, 0-1 schistocyte per HPF.   - Labs are not suggestive of DIC. Normal fibrinogen, elevated D -dimer, LDH 1200, negative Hep panel, normal folate/B12, normal haptoglobin, retic count 3.6%  -Peripheral blood flow  c/w: 66% neutrophils, 1% lymphocytes, 2% monocytes, 4% eosinophils, 3% basophils, 24% immature granulocytes, 1% blast, pending BCR-ABL    Plan:  - f/u G6PD level,  Iron panel with Ferritin  - f/u daily: CBC with diff, coags, fibrinogen and d-dimer  - trend TLS labs daily (CMP, uric acid, LDH, phosph)  - If uric acid > 8, give 3mg IV Rasburicase x1; if >12 give 6mg IV x1  - c/w Allopurinol 300mg daily (renally dose)  - c/w IVF hydration at 100 cc/hr x 12 hours  - c/w hydroxyurea 2g q8h.  - f/u BCR-ABL, PML-LASHAY   -s/p leukapheresis 1/24/24. 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  - s/p BMBx 1/24/24, f/u path  - plan to start Dasatinib 100mg 1/25/24  - Consult ophthalmology for recommendations for vision  - Check abdominal US to assess splenomegaly  - patient to be transferred to [address] [place] [address] for further management    Discussed w/ Dr. Bui and Dr. Shine Marcos MD, PhD  Heme/Onc Fellow  PGY4

## 2024-01-25 NOTE — PROGRESS NOTE ADULT - SUBJECTIVE AND OBJECTIVE BOX
INTERVAL HPI/OVERNIGHT EVENTS:  Patient S&E at bedside. Continues to have vision difficulties. No F/C, CP, SOB, abdominal pain, N/V, rash, or edema.      VITAL SIGNS:  T(F): 98.2 (01-25-24 @ 08:00)  HR: 98 (01-25-24 @ 12:05)  BP: 127/83 (01-25-24 @ 12:05)  RR: 22 (01-25-24 @ 12:05)  SpO2: 96% (01-25-24 @ 12:05)  Wt(kg): --    PHYSICAL EXAM:    Constitutional: NAD  Eyes: sclera non-icteric  Neck: supple  Respiratory: no increased WOB, CTAB/L  Cardiovascular: RRR, S1, S2, no m/g/r  Gastrointestinal: soft, NTND, no masses palpable, no HSM  Extremities: no c/c/e  Neurological: AAOx3    MEDICATIONS  (STANDING):  allopurinol 300 milliGRAM(s) Oral daily  buPROPion XL (24-Hour) . 300 milliGRAM(s) Oral daily  chlorhexidine 4% Liquid 1 Application(s) Topical <User Schedule>  dasatinib 100 milliGRAM(s) Oral daily  enoxaparin Injectable 40 milliGRAM(s) SubCutaneous every 24 hours  folic acid 1 milliGRAM(s) Oral daily  hydroxyurea 2000 milliGRAM(s) Oral every 8 hours  lactated ringers. 1000 milliLiter(s) (100 mL/Hr) IV Continuous <Continuous>  lidocaine   4% Patch 1 Patch Transdermal every 24 hours  lidocaine   4% Patch 1 Patch Transdermal daily  loratadine 10 milliGRAM(s) Oral daily  sertraline 50 milliGRAM(s) Oral daily    MEDICATIONS  (PRN):  sodium chloride 0.9% lock flush 10 milliLiter(s) IV Push every 1 hour PRN Pre/post blood products, medications, blood draw, and to maintain line patency      LABS:                        9.0    197.92 )-----------( 176      ( 25 Jan 2024 15:05 )             27.9     01-25    139  |  102  |  13  ----------------------------<  109<H>  4.1   |  25  |  0.94    Ca    9.5      25 Jan 2024 15:03  Phos  4.8     01-25  Mg     2.0     01-25    TPro  7.3  /  Alb  4.4  /  TBili  0.4  /  DBili  x   /  AST  21  /  ALT  11  /  AlkPhos  71  01-25    PT/INR - ( 24 Jan 2024 22:07 )   PT: 13.1 sec;   INR: 1.26 ratio         PTT - ( 24 Jan 2024 22:07 )  PTT:27.2 sec  Urinalysis Basic - ( 25 Jan 2024 15:03 )    Color: x / Appearance: x / SG: x / pH: x  Gluc: 109 mg/dL / Ketone: x  / Bili: x / Urobili: x   Blood: x / Protein: x / Nitrite: x   Leuk Esterase: x / RBC: x / WBC x   Sq Epi: x / Non Sq Epi: x / Bacteria: x        RADIOLOGY & ADDITIONAL TESTS:

## 2024-01-25 NOTE — CHART NOTE - NSCHARTNOTEFT_GEN_A_CORE
MICU Transfer Note    Transfer from: MICU  Transfer to:  (  ) Medicine    (  ) Telemetry    (  ) RCU    (  ) Palliative    (  ) Stroke Unit    (  ) _______________  Accepting Physician:      MICU COURSE:    29 y/o M with no significant PMHx who presented to the opthalmology for vision changes x 1 month, found to have b/l retinal hemorrhage on exam and sent to [facility] ED. Workup significant for hyperleukocytosis .2 and 42% blasts, transferred to [facility] MICU for emergent leukopheresis in setting of suspected CML w/ blast crisis. Given rasburicase 3 mg with improvement in uric acid levels. s/p 1 session leukopheresis in the MICU       For Follow-Up:          Vital Signs Last 24 Hrs  T(C): 36.7 (25 Jan 2024 04:00), Max: 36.9 (24 Jan 2024 20:00)  T(F): 98.1 (25 Jan 2024 04:00), Max: 98.5 (24 Jan 2024 20:00)  HR: 95 (25 Jan 2024 08:58) (1 - 106)  BP: 133/81 (25 Jan 2024 08:00) (99/55 - 135/82)  BP(mean): 98 (25 Jan 2024 08:00) (72 - 103)  RR: 21 (25 Jan 2024 08:00) (16 - 24)  SpO2: 95% (25 Jan 2024 08:58) (93% - 99%)    Parameters below as of 25 Jan 2024 08:00  Patient On (Oxygen Delivery Method): room air    O2 Concentration (%): 21  I&O's Summary    24 Jan 2024 07:01  -  25 Jan 2024 07:00  --------------------------------------------------------  IN: 2000 mL / OUT: 2650 mL / NET: -650 mL          MEDICATIONS  (STANDING):  allopurinol 300 milliGRAM(s) Oral daily  buPROPion XL (24-Hour) . 300 milliGRAM(s) Oral daily  chlorhexidine 4% Liquid 1 Application(s) Topical <User Schedule>  enoxaparin Injectable 40 milliGRAM(s) SubCutaneous every 24 hours  folic acid 1 milliGRAM(s) Oral daily  hydroxyurea 2000 milliGRAM(s) Oral every 8 hours  lactated ringers. 1000 milliLiter(s) (100 mL/Hr) IV Continuous <Continuous>  sertraline 50 milliGRAM(s) Oral daily    MEDICATIONS  (PRN):  morphine  - Injectable 2 milliGRAM(s) IV Push every 4 hours PRN Moderate Pain (4 - 6)  sodium chloride 0.9% lock flush 10 milliLiter(s) IV Push every 1 hour PRN Pre/post blood products, medications, blood draw, and to maintain line patency        LABS                                            8.4                   Neurophils% (auto):   58.6   (01-24 @ 22:07):    219.25)-----------(161          Lymphocytes% (auto):  2.6                                           25.5                   Eosinphils% (auto):   1.3      Manual%: Neutrophils x    ; Lymphocytes x    ; Eosinophils x    ; Bands%: x    ; Blasts x                                    139    |  102    |  14                  Calcium: 9.0   / iCa: x      (01-24 @ 22:07)    ----------------------------<  109       Magnesium: 2.0                              4.2     |  26     |  0.88             Phosphorous: 5.6      TPro  6.4    /  Alb  4.0    /  TBili  0.3    /  DBili  x      /  AST  20     /  ALT  10     /  AlkPhos  62     24 Jan 2024 22:07    ( 01-24 @ 22:07 )   PT: 13.1 sec;   INR: 1.26 ratio  aPTT: 27.2 sec MICU Transfer Note    Transfer from: MICU  Transfer to:  (  ) Medicine    (  ) Telemetry    (  ) RCU    (  ) Palliative    (  ) Stroke Unit    (  ) _______________  Accepting Physician:      MICU COURSE:    27 y/o M with no significant PMHx who presented to the opthalmology for vision changes x 1 month, found to have b/l retinal hemorrhage on exam and sent to [facility] ED. 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 Given rasburicase 3 mg with improvement in uric acid levels. Evaluated by the hematology team and flow cytometry was went with BMB at bedside on 1/24/24 pending results. s/p 1 session leukopheresis in the MICU with ROSA ruelas. Responded well with no concerns. Will need ongoing hematology follow up.      For Follow-Up:    [ ] TLS labs q8  [ ] renew IVF for TLS prevention  [ ] hematology recommendations for ongoing care      Vital Signs Last 24 Hrs  T(C): 36.7 (25 Jan 2024 04:00), Max: 36.9 (24 Jan 2024 20:00)  T(F): 98.1 (25 Jan 2024 04:00), Max: 98.5 (24 Jan 2024 20:00)  HR: 95 (25 Jan 2024 08:58) (1 - 106)  BP: 133/81 (25 Jan 2024 08:00) (99/55 - 135/82)  BP(mean): 98 (25 Jan 2024 08:00) (72 - 103)  RR: 21 (25 Jan 2024 08:00) (16 - 24)  SpO2: 95% (25 Jan 2024 08:58) (93% - 99%)    Parameters below as of 25 Jan 2024 08:00  Patient On (Oxygen Delivery Method): room air    O2 Concentration (%): 21  I&O's Summary    24 Jan 2024 07:01  -  25 Jan 2024 07:00  --------------------------------------------------------  IN: 2000 mL / OUT: 2650 mL / NET: -650 mL          MEDICATIONS  (STANDING):  allopurinol 300 milliGRAM(s) Oral daily  buPROPion XL (24-Hour) . 300 milliGRAM(s) Oral daily  chlorhexidine 4% Liquid 1 Application(s) Topical <User Schedule>  enoxaparin Injectable 40 milliGRAM(s) SubCutaneous every 24 hours  folic acid 1 milliGRAM(s) Oral daily  hydroxyurea 2000 milliGRAM(s) Oral every 8 hours  lactated ringers. 1000 milliLiter(s) (100 mL/Hr) IV Continuous <Continuous>  sertraline 50 milliGRAM(s) Oral daily    MEDICATIONS  (PRN):  morphine  - Injectable 2 milliGRAM(s) IV Push every 4 hours PRN Moderate Pain (4 - 6)  sodium chloride 0.9% lock flush 10 milliLiter(s) IV Push every 1 hour PRN Pre/post blood products, medications, blood draw, and to maintain line patency        LABS                                            8.4                   Neurophils% (auto):   58.6   (01-24 @ 22:07):    219.25)-----------(161          Lymphocytes% (auto):  2.6                                           25.5                   Eosinphils% (auto):   1.3      Manual%: Neutrophils x    ; Lymphocytes x    ; Eosinophils x    ; Bands%: x    ; Blasts x                                    139    |  102    |  14                  Calcium: 9.0   / iCa: x      (01-24 @ 22:07)    ----------------------------<  109       Magnesium: 2.0                              4.2     |  26     |  0.88             Phosphorous: 5.6      TPro  6.4    /  Alb  4.0    /  TBili  0.3    /  DBili  x      /  AST  20     /  ALT  10     /  AlkPhos  62     24 Jan 2024 22:07    ( 01-24 @ 22:07 )   PT: 13.1 sec;   INR: 1.26 ratio  aPTT: 27.2 sec MICU Transfer Note    Transfer from: MICU  Transfer to:  (  ) Medicine    (  ) Telemetry    (  ) RCU    (  ) Palliative    (  ) Stroke Unit    ( x ) [facility]  Accepting Physician: Dr. Ricardo Riojas       MICU COURSE:    29 y/o M with no significant PMHx who presented to the opthalmology for vision changes x 1 month, found to have b/l retinal hemorrhage on exam and sent to [facility] ED. Workup significant for hyperleukocytosis .2 and 42% blasts, transferred to [facility] MICU for emergent leukopheresis in setting of suspected CML w/ blast crisis. Given rasburicase 3 mg with improvement in uric acid levels. Evaluated by the hematology team and flow cytometry was went with BMB at bedside on 1/24/24 pending results. s/p 1 session leukopheresis in the MICU with ROSA ruelas. Responded well with no concerns. Will need ongoing hematology follow up.      For Follow-Up:    [ ] TLS labs q8  [ ] renew IVF for TLS prevention  [ ] hematology recommendations for ongoing care      Vital Signs Last 24 Hrs  T(C): 36.7 (25 Jan 2024 04:00), Max: 36.9 (24 Jan 2024 20:00)  T(F): 98.1 (25 Jan 2024 04:00), Max: 98.5 (24 Jan 2024 20:00)  HR: 95 (25 Jan 2024 08:58) (1 - 106)  BP: 133/81 (25 Jan 2024 08:00) (99/55 - 135/82)  BP(mean): 98 (25 Jan 2024 08:00) (72 - 103)  RR: 21 (25 Jan 2024 08:00) (16 - 24)  SpO2: 95% (25 Jan 2024 08:58) (93% - 99%)    Parameters below as of 25 Jan 2024 08:00  Patient On (Oxygen Delivery Method): room air    O2 Concentration (%): 21  I&O's Summary    24 Jan 2024 07:01  -  25 Jan 2024 07:00  --------------------------------------------------------  IN: 2000 mL / OUT: 2650 mL / NET: -650 mL          MEDICATIONS  (STANDING):  allopurinol 300 milliGRAM(s) Oral daily  buPROPion XL (24-Hour) . 300 milliGRAM(s) Oral daily  chlorhexidine 4% Liquid 1 Application(s) Topical <User Schedule>  enoxaparin Injectable 40 milliGRAM(s) SubCutaneous every 24 hours  folic acid 1 milliGRAM(s) Oral daily  hydroxyurea 2000 milliGRAM(s) Oral every 8 hours  lactated ringers. 1000 milliLiter(s) (100 mL/Hr) IV Continuous <Continuous>  sertraline 50 milliGRAM(s) Oral daily    MEDICATIONS  (PRN):  morphine  - Injectable 2 milliGRAM(s) IV Push every 4 hours PRN Moderate Pain (4 - 6)  sodium chloride 0.9% lock flush 10 milliLiter(s) IV Push every 1 hour PRN Pre/post blood products, medications, blood draw, and to maintain line patency        LABS                                            8.4                   Neurophils% (auto):   58.6   (01-24 @ 22:07):    219.25)-----------(161          Lymphocytes% (auto):  2.6                                           25.5                   Eosinphils% (auto):   1.3      Manual%: Neutrophils x    ; Lymphocytes x    ; Eosinophils x    ; Bands%: x    ; Blasts x                                    139    |  102    |  14                  Calcium: 9.0   / iCa: x      (01-24 @ 22:07)    ----------------------------<  109       Magnesium: 2.0                              4.2     |  26     |  0.88             Phosphorous: 5.6      TPro  6.4    /  Alb  4.0    /  TBili  0.3    /  DBili  x      /  AST  20     /  ALT  10     /  AlkPhos  62     24 Jan 2024 22:07    ( 01-24 @ 22:07 )   PT: 13.1 sec;   INR: 1.26 ratio  aPTT: 27.2 sec

## 2024-01-25 NOTE — PROGRESS NOTE ADULT - ATTENDING COMMENTS
27yo gentleman with PMH of ADHD and depression, presents with a new diagnosis of CML with 14% blast , with retinal hemorrhages, s/p leukopheresis x1. WBC 219K today. BCR/ABL confirm positive. Patient was transferred to [address] [place] [address]. Will start Dasatinib 100 mg daily now that his diagnosis has been confirmed.
Patient examined and care reviewed in detail on bedside rounds  Pt safe for transfer out of MICU
29yo gentleman with PMH of ADHD and depression p/w night sweats x 1 year, unintentional weight loss x6 mo, decreased vision x 1.5 months, found to have hyperleukocytosis 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  #Leukostasis  - .2, Hgb 8.5, Hct 25.3 MCV 89.4, RDW 18.7, plts 224  42% blasts, 22% bands, 6% metamyelocytes, 1% myelocytes, 3 % promyelocytes, neutrophils 16%, basophils 4%, nucleated RBCS 2  INR 1.19, PT 13.3 (mildly elevated), PTT 29.1 (WNL), fibrinogen 468  - Peripheral smear personally reviewed 1/24/24: leukocytosis noted, blasts seen with large N:C ratio and open chromatin.  WBCs in different stages of differentiation, increase basophils.   - trend TLS labs daily (CMP, uric acid, LDH, phosph)  - If uric acid > 8, give 3mg IV Rasburicase x1; if >12 give 6mg IV x1  - c/w Allopurinol 300mg daily (renally dose)  - c/w IVF hydration at 100 cc/hr x 12 hours  - c/w hydroxyurea 2g q8h.  - f/u Peripheral blood flow cytometry, BCR-ABL.  -c/w leukapheresis today 1/24/24 and tomorrow 1/25/24, with WBC goal <80K (given patient is symptomatic w/ retinal hemorrhages 2/2 leukostasis)  - s/p BMBx 1/24/24, f/u path  - Consult ophthalmology for recommendations for vision  - Check abdominal US to assess splenomegaly  - Hematology team will continue to follow

## 2024-01-26 ENCOUNTER — TRANSCRIPTION ENCOUNTER (OUTPATIENT)
Age: 29
End: 2024-01-26

## 2024-01-26 DIAGNOSIS — C92.10 CHRONIC MYELOID LEUKEMIA, BCR/ABL-POSITIVE, NOT HAVING ACHIEVED REMISSION: ICD-10-CM

## 2024-01-26 DIAGNOSIS — F32.9 MAJOR DEPRESSIVE DISORDER, SINGLE EPISODE, UNSPECIFIED: ICD-10-CM

## 2024-01-26 DIAGNOSIS — Z29.9 ENCOUNTER FOR PROPHYLACTIC MEASURES, UNSPECIFIED: ICD-10-CM

## 2024-01-26 DIAGNOSIS — B99.9 UNSPECIFIED INFECTIOUS DISEASE: ICD-10-CM

## 2024-01-26 LAB
ALBUMIN SERPL ELPH-MCNC: 3.6 G/DL — SIGNIFICANT CHANGE UP (ref 3.3–5)
ALBUMIN SERPL ELPH-MCNC: 4 G/DL — SIGNIFICANT CHANGE UP (ref 3.3–5)
ALP SERPL-CCNC: 56 U/L — SIGNIFICANT CHANGE UP (ref 40–120)
ALP SERPL-CCNC: 73 U/L — SIGNIFICANT CHANGE UP (ref 40–120)
ALT FLD-CCNC: 11 U/L — SIGNIFICANT CHANGE UP (ref 10–45)
ALT FLD-CCNC: 19 U/L — SIGNIFICANT CHANGE UP (ref 10–45)
ANION GAP SERPL CALC-SCNC: 11 MMOL/L — SIGNIFICANT CHANGE UP (ref 5–17)
ANION GAP SERPL CALC-SCNC: 13 MMOL/L — SIGNIFICANT CHANGE UP (ref 5–17)
AST SERPL-CCNC: 17 U/L — SIGNIFICANT CHANGE UP (ref 10–40)
AST SERPL-CCNC: 33 U/L — SIGNIFICANT CHANGE UP (ref 10–40)
BASOPHILS # BLD AUTO: 3.98 K/UL — HIGH (ref 0–0.2)
BASOPHILS # BLD AUTO: 4.71 K/UL — HIGH (ref 0–0.2)
BASOPHILS NFR BLD AUTO: 3 % — HIGH (ref 0–2)
BASOPHILS NFR BLD AUTO: 3.2 % — HIGH (ref 0–2)
BILIRUB SERPL-MCNC: 0.3 MG/DL — SIGNIFICANT CHANGE UP (ref 0.2–1.2)
BILIRUB SERPL-MCNC: 0.4 MG/DL — SIGNIFICANT CHANGE UP (ref 0.2–1.2)
BLD GP AB SCN SERPL QL: NEGATIVE — SIGNIFICANT CHANGE UP
BUN SERPL-MCNC: 14 MG/DL — SIGNIFICANT CHANGE UP (ref 7–23)
BUN SERPL-MCNC: 19 MG/DL — SIGNIFICANT CHANGE UP (ref 7–23)
CALCIUM SERPL-MCNC: 8.4 MG/DL — SIGNIFICANT CHANGE UP (ref 8.4–10.5)
CALCIUM SERPL-MCNC: 8.6 MG/DL — SIGNIFICANT CHANGE UP (ref 8.4–10.5)
CHLORIDE SERPL-SCNC: 102 MMOL/L — SIGNIFICANT CHANGE UP (ref 96–108)
CHLORIDE SERPL-SCNC: 103 MMOL/L — SIGNIFICANT CHANGE UP (ref 96–108)
CO2 SERPL-SCNC: 22 MMOL/L — SIGNIFICANT CHANGE UP (ref 22–31)
CO2 SERPL-SCNC: 24 MMOL/L — SIGNIFICANT CHANGE UP (ref 22–31)
CREAT SERPL-MCNC: 0.88 MG/DL — SIGNIFICANT CHANGE UP (ref 0.5–1.3)
CREAT SERPL-MCNC: 1.04 MG/DL — SIGNIFICANT CHANGE UP (ref 0.5–1.3)
D DIMER BLD IA.RAPID-MCNC: 660 NG/ML DDU — HIGH
EGFR: 119 ML/MIN/1.73M2 — SIGNIFICANT CHANGE UP
EGFR: 99 ML/MIN/1.73M2 — SIGNIFICANT CHANGE UP
EOSINOPHIL # BLD AUTO: 2.54 K/UL — HIGH (ref 0–0.5)
EOSINOPHIL # BLD AUTO: 3.98 K/UL — HIGH (ref 0–0.5)
EOSINOPHIL NFR BLD AUTO: 1.7 % — SIGNIFICANT CHANGE UP (ref 0–6)
EOSINOPHIL NFR BLD AUTO: 3 % — SIGNIFICANT CHANGE UP (ref 0–6)
FIBRINOGEN PPP-MCNC: 367 MG/DL — SIGNIFICANT CHANGE UP (ref 200–445)
GLUCOSE SERPL-MCNC: 104 MG/DL — HIGH (ref 70–99)
GLUCOSE SERPL-MCNC: 95 MG/DL — SIGNIFICANT CHANGE UP (ref 70–99)
HCT VFR BLD CALC: 23.9 % — LOW (ref 39–50)
HCT VFR BLD CALC: 26.9 % — LOW (ref 39–50)
HGB BLD-MCNC: 7.6 G/DL — LOW (ref 13–17)
HGB BLD-MCNC: 8.6 G/DL — LOW (ref 13–17)
IMM GRANULOCYTES NFR BLD AUTO: 15.6 % — HIGH (ref 0–0.9)
INR BLD: 1.17 RATIO — SIGNIFICANT CHANGE UP (ref 0.85–1.18)
LDH SERPL L TO P-CCNC: 1112 U/L — HIGH (ref 50–242)
LDH SERPL L TO P-CCNC: 996 U/L — HIGH (ref 50–242)
LYMPHOCYTES # BLD AUTO: 4 % — LOW (ref 13–44)
LYMPHOCYTES # BLD AUTO: 5.3 K/UL — HIGH (ref 1–3.3)
LYMPHOCYTES # BLD AUTO: 5.9 % — LOW (ref 13–44)
LYMPHOCYTES # BLD AUTO: 8.74 K/UL — HIGH (ref 1–3.3)
MAGNESIUM SERPL-MCNC: 2.1 MG/DL — SIGNIFICANT CHANGE UP (ref 1.6–2.6)
MAGNESIUM SERPL-MCNC: 2.2 MG/DL — SIGNIFICANT CHANGE UP (ref 1.6–2.6)
MANUAL SMEAR VERIFICATION: SIGNIFICANT CHANGE UP
MCHC RBC-ENTMCNC: 28 PG — SIGNIFICANT CHANGE UP (ref 27–34)
MCHC RBC-ENTMCNC: 28.1 PG — SIGNIFICANT CHANGE UP (ref 27–34)
MCHC RBC-ENTMCNC: 31.8 GM/DL — LOW (ref 32–36)
MCHC RBC-ENTMCNC: 32 GM/DL — SIGNIFICANT CHANGE UP (ref 32–36)
MCV RBC AUTO: 87.9 FL — SIGNIFICANT CHANGE UP (ref 80–100)
MCV RBC AUTO: 88.2 FL — SIGNIFICANT CHANGE UP (ref 80–100)
METAMYELOCYTES # FLD: 1 % — HIGH (ref 0–0)
MONOCYTES # BLD AUTO: 1.33 K/UL — HIGH (ref 0–0.9)
MONOCYTES # BLD AUTO: 2.15 K/UL — HIGH (ref 0–0.9)
MONOCYTES NFR BLD AUTO: 1 % — LOW (ref 2–14)
MONOCYTES NFR BLD AUTO: 1.5 % — LOW (ref 2–14)
MYELOCYTES NFR BLD: 3 % — HIGH (ref 0–0)
NEUTROPHILS # BLD AUTO: 106.8 K/UL — HIGH (ref 1.8–7.4)
NEUTROPHILS # BLD AUTO: 112.67 K/UL — HIGH (ref 1.8–7.4)
NEUTROPHILS NFR BLD AUTO: 70 % — SIGNIFICANT CHANGE UP (ref 43–77)
NEUTROPHILS NFR BLD AUTO: 72.1 % — SIGNIFICANT CHANGE UP (ref 43–77)
NEUTS BAND # BLD: 15 % — HIGH (ref 0–8)
NRBC # BLD: 0 /100 WBCS — SIGNIFICANT CHANGE UP (ref 0–0)
NRBC # BLD: 1 /100 WBCS — HIGH (ref 0–0)
PHOSPHATE SERPL-MCNC: 3.9 MG/DL — SIGNIFICANT CHANGE UP (ref 2.5–4.5)
PHOSPHATE SERPL-MCNC: 4.6 MG/DL — HIGH (ref 2.5–4.5)
PLAT MORPH BLD: NORMAL — SIGNIFICANT CHANGE UP
PLATELET # BLD AUTO: 148 K/UL — LOW (ref 150–400)
PLATELET # BLD AUTO: 185 K/UL — SIGNIFICANT CHANGE UP (ref 150–400)
POTASSIUM SERPL-MCNC: 3.8 MMOL/L — SIGNIFICANT CHANGE UP (ref 3.5–5.3)
POTASSIUM SERPL-MCNC: 4.4 MMOL/L — SIGNIFICANT CHANGE UP (ref 3.5–5.3)
POTASSIUM SERPL-SCNC: 3.8 MMOL/L — SIGNIFICANT CHANGE UP (ref 3.5–5.3)
POTASSIUM SERPL-SCNC: 4.4 MMOL/L — SIGNIFICANT CHANGE UP (ref 3.5–5.3)
PROT SERPL-MCNC: 6.1 G/DL — SIGNIFICANT CHANGE UP (ref 6–8.3)
PROT SERPL-MCNC: 7 G/DL — SIGNIFICANT CHANGE UP (ref 6–8.3)
PROTHROM AB SERPL-ACNC: 12.2 SEC — SIGNIFICANT CHANGE UP (ref 9.5–13)
RBC # BLD: 2.71 M/UL — LOW (ref 4.2–5.8)
RBC # BLD: 3.06 M/UL — LOW (ref 4.2–5.8)
RBC # FLD: 18 % — HIGH (ref 10.3–14.5)
RBC # FLD: 18.2 % — HIGH (ref 10.3–14.5)
RBC BLD AUTO: SIGNIFICANT CHANGE UP
RH IG SCN BLD-IMP: POSITIVE — SIGNIFICANT CHANGE UP
SODIUM SERPL-SCNC: 137 MMOL/L — SIGNIFICANT CHANGE UP (ref 135–145)
SODIUM SERPL-SCNC: 138 MMOL/L — SIGNIFICANT CHANGE UP (ref 135–145)
URATE SERPL-MCNC: 5.5 MG/DL — SIGNIFICANT CHANGE UP (ref 3.4–8.8)
URATE SERPL-MCNC: 6.2 MG/DL — SIGNIFICANT CHANGE UP (ref 3.4–8.8)
WBC # BLD: 132.55 K/UL — CRITICAL HIGH (ref 3.8–10.5)
WBC # BLD: 148.08 K/UL — CRITICAL HIGH (ref 3.8–10.5)
WBC # FLD AUTO: 132.55 K/UL — CRITICAL HIGH (ref 3.8–10.5)
WBC # FLD AUTO: 148.08 K/UL — CRITICAL HIGH (ref 3.8–10.5)

## 2024-01-26 PROCEDURE — 99233 SBSQ HOSP IP/OBS HIGH 50: CPT

## 2024-01-26 RX ORDER — SODIUM CHLORIDE 9 MG/ML
1000 INJECTION INTRAMUSCULAR; INTRAVENOUS; SUBCUTANEOUS
Refills: 0 | Status: DISCONTINUED | OUTPATIENT
Start: 2024-01-26 | End: 2024-01-28

## 2024-01-26 RX ORDER — DIPHENHYDRAMINE HYDROCHLORIDE AND LIDOCAINE HYDROCHLORIDE AND ALUMINUM HYDROXIDE AND MAGNESIUM HYDRO
30 KIT ONCE
Refills: 0 | Status: DISCONTINUED | OUTPATIENT
Start: 2024-01-26 | End: 2024-01-28

## 2024-01-26 RX ORDER — HYDROCORTISONE 1 %
1 OINTMENT (GRAM) TOPICAL
Refills: 0 | Status: DISCONTINUED | OUTPATIENT
Start: 2024-01-26 | End: 2024-01-28

## 2024-01-26 RX ORDER — HYDROMORPHONE HYDROCHLORIDE 2 MG/ML
1 INJECTION INTRAMUSCULAR; INTRAVENOUS; SUBCUTANEOUS ONCE
Refills: 0 | Status: DISCONTINUED | OUTPATIENT
Start: 2024-01-26 | End: 2024-01-26

## 2024-01-26 RX ADMIN — CHLORHEXIDINE GLUCONATE 1 APPLICATION(S): 213 SOLUTION TOPICAL at 09:35

## 2024-01-26 RX ADMIN — Medication 1 APPLICATION(S): at 18:59

## 2024-01-26 RX ADMIN — LIDOCAINE 1 PATCH: 4 CREAM TOPICAL at 03:29

## 2024-01-26 RX ADMIN — HYDROMORPHONE HYDROCHLORIDE 1 MILLIGRAM(S): 2 INJECTION INTRAMUSCULAR; INTRAVENOUS; SUBCUTANEOUS at 12:39

## 2024-01-26 RX ADMIN — Medication 1 MILLIGRAM(S): at 12:26

## 2024-01-26 RX ADMIN — SODIUM CHLORIDE 100 MILLILITER(S): 9 INJECTION INTRAMUSCULAR; INTRAVENOUS; SUBCUTANEOUS at 22:15

## 2024-01-26 RX ADMIN — HYDROMORPHONE HYDROCHLORIDE 0.5 MILLIGRAM(S): 2 INJECTION INTRAMUSCULAR; INTRAVENOUS; SUBCUTANEOUS at 23:17

## 2024-01-26 RX ADMIN — HYDROMORPHONE HYDROCHLORIDE 0.5 MILLIGRAM(S): 2 INJECTION INTRAMUSCULAR; INTRAVENOUS; SUBCUTANEOUS at 09:27

## 2024-01-26 RX ADMIN — HYDROMORPHONE HYDROCHLORIDE 0.5 MILLIGRAM(S): 2 INJECTION INTRAMUSCULAR; INTRAVENOUS; SUBCUTANEOUS at 18:43

## 2024-01-26 RX ADMIN — HYDROMORPHONE HYDROCHLORIDE 0.5 MILLIGRAM(S): 2 INJECTION INTRAMUSCULAR; INTRAVENOUS; SUBCUTANEOUS at 14:35

## 2024-01-26 RX ADMIN — HYDROXYUREA 2000 MILLIGRAM(S): 500 CAPSULE ORAL at 22:14

## 2024-01-26 RX ADMIN — SODIUM CHLORIDE 100 MILLILITER(S): 9 INJECTION INTRAMUSCULAR; INTRAVENOUS; SUBCUTANEOUS at 12:39

## 2024-01-26 RX ADMIN — LORATADINE 10 MILLIGRAM(S): 10 TABLET ORAL at 12:27

## 2024-01-26 RX ADMIN — Medication 300 MILLIGRAM(S): at 12:26

## 2024-01-26 RX ADMIN — HYDROXYUREA 2000 MILLIGRAM(S): 500 CAPSULE ORAL at 05:19

## 2024-01-26 RX ADMIN — HYDROMORPHONE HYDROCHLORIDE 0.5 MILLIGRAM(S): 2 INJECTION INTRAMUSCULAR; INTRAVENOUS; SUBCUTANEOUS at 09:57

## 2024-01-26 RX ADMIN — DASATINIB 100 MILLIGRAM(S): 80 TABLET ORAL at 12:26

## 2024-01-26 RX ADMIN — LIDOCAINE 1 PATCH: 4 CREAM TOPICAL at 04:33

## 2024-01-26 RX ADMIN — HYDROMORPHONE HYDROCHLORIDE 1 MILLIGRAM(S): 2 INJECTION INTRAMUSCULAR; INTRAVENOUS; SUBCUTANEOUS at 13:09

## 2024-01-26 RX ADMIN — HYDROMORPHONE HYDROCHLORIDE 0.5 MILLIGRAM(S): 2 INJECTION INTRAMUSCULAR; INTRAVENOUS; SUBCUTANEOUS at 00:13

## 2024-01-26 RX ADMIN — HYDROMORPHONE HYDROCHLORIDE 0.5 MILLIGRAM(S): 2 INJECTION INTRAMUSCULAR; INTRAVENOUS; SUBCUTANEOUS at 15:05

## 2024-01-26 RX ADMIN — SERTRALINE 50 MILLIGRAM(S): 25 TABLET, FILM COATED ORAL at 12:26

## 2024-01-26 RX ADMIN — HYDROMORPHONE HYDROCHLORIDE 0.5 MILLIGRAM(S): 2 INJECTION INTRAMUSCULAR; INTRAVENOUS; SUBCUTANEOUS at 19:13

## 2024-01-26 RX ADMIN — HYDROXYUREA 2000 MILLIGRAM(S): 500 CAPSULE ORAL at 14:39

## 2024-01-26 RX ADMIN — BUPROPION HYDROCHLORIDE 300 MILLIGRAM(S): 150 TABLET, EXTENDED RELEASE ORAL at 12:26

## 2024-01-26 NOTE — DISCHARGE NOTE PROVIDER - CARE PROVIDER_API CALL
Ricardo Riojas [facility]  [address] [place] [id number]  Phone: [phone]  Fax: [phone]  Follow Up Time:

## 2024-01-26 NOTE — PROGRESS NOTE ADULT - NS ATTEND AMEND GEN_ALL_CORE FT
29 yo male with for vision changes x 1 month, found to have diffuse retinal hemorrhages by Dr Arreola sent directly to [facility] ED. 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 Dasatinib started 1/25/24.    Heme:  - Diagnosed with CML based on peripheral blood criteria in presence of BCR-ABL1, BM biopsy pending to evaluate for myelofibrosis and degree of immaturity.  - BCR:ABL1 p210 > 10% on RT-qPCR, p190 false positive low level  -s/p 1x leukapheresis 1/24/24, discontinued given no significant blast population. Remove right IJ catheter  - Treatment with Hydroxyurea 2 grams q8hrs to leukoreduce, discontinue when < 50k  - Started Dasatinib 100mg 1/25/24 for chronic phase CML  - Retinal hemorrhages possibly due to leukocytosis, mild coagulopathy, d-dimer peak ~800, continue with opthalmology follow-up (Dr. Aurea Arreola)    MSK:  - Left hip pain radiates to knee / left tibia, suspect role of leukemia, however order xray films for left hip and left knee  - Right BM site pain, lidocaine patch and oxycodone as needed  - PT, encourage ambulation, likely will improve with decrease in WBC and eventual control of CML    Dispo:  - Dasatinib 100 mg daily ordered for outpatient, requires PA  - Discharge when able to start tapering Hydroxyurea and maintain WBC below 100k

## 2024-01-26 NOTE — DISCHARGE NOTE PROVIDER - NSDCFUADDINST_GEN_ALL_CORE_FT
Tentative follow up appointment scheduled for Wednesday 1/31 2pm for labwork and to see Dr. Riojas.

## 2024-01-26 NOTE — DISCHARGE NOTE PROVIDER - NSDCCPCAREPLAN_GEN_ALL_CORE_FT
PRINCIPAL DISCHARGE DIAGNOSIS  Diagnosis: CML (chronic myeloid leukemia)  Assessment and Plan of Treatment: Return to hospital if yo experience fevers, shortness of breathe, chest pain, dizziness, abdominal pain, bleeding     PRINCIPAL DISCHARGE DIAGNOSIS  Diagnosis: CML (chronic myeloid leukemia)  Assessment and Plan of Treatment: Return to hospital if you experience fevers, shortness of breathe, chest pain, dizziness, abdominal pain, bleeding  Await insurance approval for dasatinib - you will hear from [facility] regarding this. [phone]

## 2024-01-26 NOTE — DISCHARGE NOTE PROVIDER - NSDCMRMEDTOKEN_GEN_ALL_CORE_FT
Adderall 15 mg oral tablet: 1 tab(s) orally once a day  buPROPion 300 mg/24 hours (XL) oral tablet, extended release: 1 tab(s) orally once a day  dasatinib 100 mg oral tablet: 1 tab(s) orally once a day  sertraline 50 mg oral tablet: 1 tab(s) orally once a day   Adderall 15 mg oral tablet: 1 tab(s) orally once a day  allopurinol 300 mg oral tablet: 1 tab(s) orally once a day  buPROPion 300 mg/24 hours (XL) oral tablet, extended release: 1 tab(s) orally once a day  dasatinib 100 mg oral tablet: 1 tab(s) orally once a day  hydroxyurea 500 mg oral capsule: 2 cap(s) orally every 12 hours  sertraline 50 mg oral tablet: 1 tab(s) orally once a day

## 2024-01-26 NOTE — PROGRESS NOTE ADULT - PROBLEM SELECTOR PLAN 1
-s/p leukapheresis 1/24/24. 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  - s/p BMBx 1/24/24, f/u path  - plan to start Dasatinib 100mg 1/25/24  - f/u G6PD level,  Iron panel with Ferritin  - f/u daily: CBC with diff, coags, fibrinogen and d-dimer  - trend TLS labs daily (CMP, uric acid, LDH, phosph)  - If uric acid > 8, give 3mg IV Rasburicase x1; if >12 give 6mg IV x1  - c/w Allopurinol 300mg daily (renally dose)  - c/w IVF hydration at 100 cc/hr x 12 hours  - c/w hydroxyurea 2g q8h.  - f/u BCR-ABL, PML-LASHAY   1/26 US abdom + Spleenomegaly -s/p leukapheresis 1/24/24. 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 BCR-ABL +    - s/p BMBx 1/24/24, f/u path  - Started Dasatinib 100mg 1/25/24  - f/u G6PD level  - Daily CBC w diff, coags, fibrinogen and d-dimer  - trend TLS labs bid for now (CMP, uric acid, LDH, phosph)  - If uric acid > 8, give 3mg IV Rasburicase x1; if >12 give 6mg IV x1  - c/w Allopurinol 300mg daily   - c/w IVF hydration at 100 cc/hr, dauly wghts, strict I/O's  - c/w hydroxyurea 2g q8h    1/26 US abdom + Spleenomegaly -s/p leukapheresis 1/24/24. 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 BCR-ABL +    - s/p BMBx 1/24/24, f/u path  - Started Dasatinib 100mg 1/25/24  - f/u G6PD level  - Daily CBC w diff, coags, fibrinogen and d-dimer  - trend TLS labs bid for now (CMP, uric acid, LDH, phosph)  - If uric acid > 8, give 3mg IV Rasburicase x1; if >12 give 6mg IV x1  - c/w Allopurinol 300mg daily   - c/w IVF hydration at 100 cc/hr, dauly wghts, strict I/O's  - c/w hydroxyurea 2g q8h  - 1/26 D/C'd Vidhi without complications  1/26 US abdom + Spleenomegaly

## 2024-01-26 NOTE — PROGRESS NOTE ADULT - SUBJECTIVE AND OBJECTIVE BOX
Diagnosis: r/o CML    Protocol/Chemo Regimen: Dasatinib    Day: 2    Subjective:    Review of Systems:    Pain scale:                     Diet: regular    Allergies    No Known Allergies    Intolerances      ANTIMICROBIALS    HEME/ONC MEDICATIONS  dasatinib 100 milliGRAM(s) Oral daily  enoxaparin Injectable 40 milliGRAM(s) SubCutaneous every 24 hours  hydroxyurea 2000 milliGRAM(s) Oral every 8 hours    STANDING MEDICATIONS  allopurinol 300 milliGRAM(s) Oral daily  buPROPion XL (24-Hour) . 300 milliGRAM(s) Oral daily  chlorhexidine 4% Liquid 1 Application(s) Topical <User Schedule>  folic acid 1 milliGRAM(s) Oral daily  lactated ringers. 1000 milliLiter(s) IV Continuous <Continuous>  lidocaine   4% Patch 1 Patch Transdermal every 24 hours  lidocaine   4% Patch 1 Patch Transdermal daily  loratadine 10 milliGRAM(s) Oral daily  sertraline 50 milliGRAM(s) Oral daily    PRN MEDICATIONS  HYDROmorphone  Injectable 0.5 milliGRAM(s) IV Push every 4 hours PRN  sodium chloride 0.9% lock flush 10 milliLiter(s) IV Push every 1 hour PRN    Vital Signs Last 24 Hrs  T(C): 36.6 (26 Jan 2024 05:00), Max: 36.9 (25 Jan 2024 21:32)  T(F): 97.8 (26 Jan 2024 05:00), Max: 98.4 (25 Jan 2024 21:32)  HR: 84 (26 Jan 2024 06:10) (81 - 110)  BP: 128/67 (26 Jan 2024 05:00) (116/65 - 145/82)  BP(mean): 100 (25 Jan 2024 11:00) (95 - 100)  RR: 18 (26 Jan 2024 05:00) (18 - 27)  SpO2: 95% (26 Jan 2024 06:10) (94% - 97%)    Parameters below as of 26 Jan 2024 05:00  Patient On (Oxygen Delivery Method): room air      PHYSICAL EXAM  General:   HEENT:   Neck:   CV:   Lungs:   Abdomen:   Ext:   Skin:   Neuro:   Central line:     LABS:                 Radiology:    < from: US Abdomen Complete (US Abdomen Complete .) (01.24.24 @ 16:40) >  IMPRESSION:  Splenomegaly. No focal splenic lesion.    Otherwise, essentially unremarkable abdominal ultrasound.        < from: CT Head No Cont (01.24.24 @ 02:00) >  IMPRESSION:  1. Unremarkable, unenhanced CT imaging of the brain for the patient's age.     Diagnosis: r/o CML    Protocol/Chemo Regimen: Dasatinib    Day: 2    Subjective: afebrile, no overnight events. +R LBP (at bx site), intermittent L hip/leg pain. +OOB walking    Review of Systems: Rest of ROS negative                     Diet: regular    Allergies    No Known Allergies    Intolerances      ANTIMICROBIALS    HEME/ONC MEDICATIONS  dasatinib 100 milliGRAM(s) Oral daily  enoxaparin Injectable 40 milliGRAM(s) SubCutaneous every 24 hours  hydroxyurea 2000 milliGRAM(s) Oral every 8 hours    STANDING MEDICATIONS  allopurinol 300 milliGRAM(s) Oral daily  buPROPion XL (24-Hour) . 300 milliGRAM(s) Oral daily  chlorhexidine 4% Liquid 1 Application(s) Topical <User Schedule>  folic acid 1 milliGRAM(s) Oral daily  lactated ringers. 1000 milliLiter(s) IV Continuous <Continuous>  lidocaine   4% Patch 1 Patch Transdermal every 24 hours  lidocaine   4% Patch 1 Patch Transdermal daily  loratadine 10 milliGRAM(s) Oral daily  sertraline 50 milliGRAM(s) Oral daily    PRN MEDICATIONS  HYDROmorphone  Injectable 0.5 milliGRAM(s) IV Push every 4 hours PRN  sodium chloride 0.9% lock flush 10 milliLiter(s) IV Push every 1 hour PRN    Vital Signs Last 24 Hrs  T(C): 36.6 (26 Jan 2024 05:00), Max: 36.9 (25 Jan 2024 21:32)  T(F): 97.8 (26 Jan 2024 05:00), Max: 98.4 (25 Jan 2024 21:32)  HR: 84 (26 Jan 2024 06:10) (81 - 110)  BP: 128/67 (26 Jan 2024 05:00) (116/65 - 145/82)  BP(mean): 100 (25 Jan 2024 11:00) (95 - 100)  RR: 18 (26 Jan 2024 05:00) (18 - 27)  SpO2: 95% (26 Jan 2024 06:10) (94% - 97%)    Parameters below as of 26 Jan 2024 05:00  Patient On (Oxygen Delivery Method): room air      PHYSICAL EXAM  General: Sitting up in bed, NAD  HEENT: Sclerae anicteric, no oral lesions  Neck: R shiley, mild oozing in dressing  CV: +S1,S2, reg, no mur apprec  Lungs: CTA b/l  Abdomen: +BS, soft, NT/ND  Ext: no edema BLE's  Skin: no rashes  Neuro: A&O x 3, non focal  PIC: cdi     LABS:                          7.6    132.55 )-----------( 148      ( 26 Jan 2024 07:12 )             23.9     26 Jan 2024 07:07    138    |  103    |  14     ----------------------------<  104    3.8     |  24     |  0.88     Ca    8.4        26 Jan 2024 07:07  Phos  4.6       26 Jan 2024 07:07  Mg     2.1       26 Jan 2024 07:07    TPro  6.1    /  Alb  3.6    /  TBili  0.3    /  DBili  x      /  AST  17     /  ALT  11     /  AlkPhos  56     26 Jan 2024 07:07    PT/INR - ( 26 Jan 2024 07:12 )   PT: 12.2 sec;   INR: 1.17 ratio    PTT - ( 24 Jan 2024 22:07 )  PTT:27.2 sec      LIVER FUNCTIONS - ( 26 Jan 2024 07:07 )  Alb: 3.6 g/dL / Pro: 6.1 g/dL / ALK PHOS: 56 U/L / ALT: 11 U/L / AST: 17 U/L / GGT: x           Urinalysis Basic - ( 26 Jan 2024 07:07 )    Color: x / Appearance: x / SG: x / pH: x  Gluc: 104 mg/dL / Ketone: x  / Bili: x / Urobili: x   Blood: x / Protein: x / Nitrite: x   Leuk Esterase: x / RBC: x / WBC x   Sq Epi: x / Non Sq Epi: x / Bacteria: x          Radiology:    < from: US Abdomen Complete (US Abdomen Complete .) (01.24.24 @ 16:40) >  IMPRESSION:  Splenomegaly. No focal splenic lesion.  Otherwise, essentially unremarkable abdominal ultrasound.      < from: CT Head No Cont (01.24.24 @ 02:00) >  IMPRESSION:  1. Unremarkable, unenhanced CT imaging of the brain for the patient's age.

## 2024-01-26 NOTE — DISCHARGE NOTE PROVIDER - HOSPITAL COURSE
27 y/o M DANIELA on CPAP, depression/anxiety who presented to the opthalmology for vision changes x 1 month, found to have b/l retinal hemorrhage on exam and sent to [facility] ED. Workup significant for hyperleukocytosis .2 and 42% blasts, transferred to [facility] MICU for emergent leukopheresis in setting of suspected CML w/ blast crisis. Shiley catheter was placed. Hydroxyurea was started. He received one round of leukopheresis with improvement in white count. Heme service consulted on admission and performed BMBx, results pending. He was transferred out of MICU to [address] [place] [address]. Tumor lysis labs were checked bid, Dasatanib started on 1/25. US of abdomen revealed spleenomegaly. 29 y/o M DANIELA on CPAP, depression/anxiety who presented to the opthalmology for vision changes x 1 month, found to have b/l retinal hemorrhage on exam and sent to [facility] ED. Workup significant for hyperleukocytosis .2 and 42% blasts, transferred to [facility] MICU for emergent leukopheresis in setting of suspected CML w/ blast crisis. Shiley catheter was placed. Hydroxyurea was started. He received one round of leukopheresis with improvement in white count. Heme service consulted on admission and performed BMBx, results pending. He was transferred out of MICU to [address] [place] [address]. Tumor lysis labs were checked bid, Dasatanib started on 1/25 (outpatient PA pending). US of abdomen revealed splenomegaly. Patient stable for discharge with outpatient follow up arranged with Dr. Riojas.

## 2024-01-26 NOTE — PROGRESS NOTE ADULT - ASSESSMENT
29 y/o gentleman with PMH of ADHD and depression p/w night sweats x 1 year, unintentional weight loss x6 mo, decreased vision x 1.5 months, found to have hyperleukocytosis 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

## 2024-01-27 LAB
ALBUMIN SERPL ELPH-MCNC: 3.8 G/DL — SIGNIFICANT CHANGE UP (ref 3.3–5)
ALBUMIN SERPL ELPH-MCNC: 4 G/DL — SIGNIFICANT CHANGE UP (ref 3.3–5)
ALP SERPL-CCNC: 61 U/L — SIGNIFICANT CHANGE UP (ref 40–120)
ALP SERPL-CCNC: 75 U/L — SIGNIFICANT CHANGE UP (ref 40–120)
ALT FLD-CCNC: 21 U/L — SIGNIFICANT CHANGE UP (ref 10–45)
ALT FLD-CCNC: 28 U/L — SIGNIFICANT CHANGE UP (ref 10–45)
ANION GAP SERPL CALC-SCNC: 12 MMOL/L — SIGNIFICANT CHANGE UP (ref 5–17)
ANION GAP SERPL CALC-SCNC: 13 MMOL/L — SIGNIFICANT CHANGE UP (ref 5–17)
APTT BLD: 27.3 SEC — SIGNIFICANT CHANGE UP (ref 24.5–35.6)
AST SERPL-CCNC: 28 U/L — SIGNIFICANT CHANGE UP (ref 10–40)
AST SERPL-CCNC: 35 U/L — SIGNIFICANT CHANGE UP (ref 10–40)
BASOPHILS # BLD AUTO: 3.41 K/UL — HIGH (ref 0–0.2)
BASOPHILS # BLD AUTO: 5.26 K/UL — HIGH (ref 0–0.2)
BASOPHILS NFR BLD AUTO: 3.3 % — HIGH (ref 0–2)
BASOPHILS NFR BLD AUTO: 5.1 % — HIGH (ref 0–2)
BILIRUB SERPL-MCNC: 0.4 MG/DL — SIGNIFICANT CHANGE UP (ref 0.2–1.2)
BILIRUB SERPL-MCNC: 0.4 MG/DL — SIGNIFICANT CHANGE UP (ref 0.2–1.2)
BLASTS # FLD: 0.8 % — HIGH (ref 0–0)
BUN SERPL-MCNC: 16 MG/DL — SIGNIFICANT CHANGE UP (ref 7–23)
BUN SERPL-MCNC: 20 MG/DL — SIGNIFICANT CHANGE UP (ref 7–23)
CALCIUM SERPL-MCNC: 8.4 MG/DL — SIGNIFICANT CHANGE UP (ref 8.4–10.5)
CALCIUM SERPL-MCNC: 8.7 MG/DL — SIGNIFICANT CHANGE UP (ref 8.4–10.5)
CHLORIDE SERPL-SCNC: 104 MMOL/L — SIGNIFICANT CHANGE UP (ref 96–108)
CHLORIDE SERPL-SCNC: 107 MMOL/L — SIGNIFICANT CHANGE UP (ref 96–108)
CO2 SERPL-SCNC: 22 MMOL/L — SIGNIFICANT CHANGE UP (ref 22–31)
CO2 SERPL-SCNC: 22 MMOL/L — SIGNIFICANT CHANGE UP (ref 22–31)
CREAT SERPL-MCNC: 0.84 MG/DL — SIGNIFICANT CHANGE UP (ref 0.5–1.3)
CREAT SERPL-MCNC: 0.89 MG/DL — SIGNIFICANT CHANGE UP (ref 0.5–1.3)
D DIMER BLD IA.RAPID-MCNC: 555 NG/ML DDU — HIGH
EGFR: 118 ML/MIN/1.73M2 — SIGNIFICANT CHANGE UP
EGFR: 120 ML/MIN/1.73M2 — SIGNIFICANT CHANGE UP
EOSINOPHIL # BLD AUTO: 1.75 K/UL — HIGH (ref 0–0.5)
EOSINOPHIL # BLD AUTO: 1.98 K/UL — HIGH (ref 0–0.5)
EOSINOPHIL NFR BLD AUTO: 1.7 % — SIGNIFICANT CHANGE UP (ref 0–6)
EOSINOPHIL NFR BLD AUTO: 1.9 % — SIGNIFICANT CHANGE UP (ref 0–6)
FIBRINOGEN PPP-MCNC: 444 MG/DL — SIGNIFICANT CHANGE UP (ref 200–445)
GLUCOSE SERPL-MCNC: 100 MG/DL — HIGH (ref 70–99)
GLUCOSE SERPL-MCNC: 99 MG/DL — SIGNIFICANT CHANGE UP (ref 70–99)
HCT VFR BLD CALC: 24.8 % — LOW (ref 39–50)
HCT VFR BLD CALC: 26.2 % — LOW (ref 39–50)
HGB BLD-MCNC: 7.7 G/DL — LOW (ref 13–17)
HGB BLD-MCNC: 8.2 G/DL — LOW (ref 13–17)
IMM GRANULOCYTES NFR BLD AUTO: 10.1 % — HIGH (ref 0–0.9)
INR BLD: 1.01 RATIO — SIGNIFICANT CHANGE UP (ref 0.85–1.18)
LDH SERPL L TO P-CCNC: 1033 U/L — HIGH (ref 50–242)
LDH SERPL L TO P-CCNC: 891 U/L — HIGH (ref 50–242)
LDH SERPL L TO P-CCNC: 954 U/L — HIGH (ref 50–242)
LYMPHOCYTES # BLD AUTO: 0.9 % — LOW (ref 13–44)
LYMPHOCYTES # BLD AUTO: 0.93 K/UL — LOW (ref 1–3.3)
LYMPHOCYTES # BLD AUTO: 5.2 % — LOW (ref 13–44)
LYMPHOCYTES # BLD AUTO: 5.41 K/UL — HIGH (ref 1–3.3)
MAGNESIUM SERPL-MCNC: 2.3 MG/DL — SIGNIFICANT CHANGE UP (ref 1.6–2.6)
MAGNESIUM SERPL-MCNC: 2.4 MG/DL — SIGNIFICANT CHANGE UP (ref 1.6–2.6)
MANUAL SMEAR VERIFICATION: SIGNIFICANT CHANGE UP
MCHC RBC-ENTMCNC: 26.9 PG — LOW (ref 27–34)
MCHC RBC-ENTMCNC: 27.2 PG — SIGNIFICANT CHANGE UP (ref 27–34)
MCHC RBC-ENTMCNC: 31 GM/DL — LOW (ref 32–36)
MCHC RBC-ENTMCNC: 31.3 GM/DL — LOW (ref 32–36)
MCV RBC AUTO: 86.7 FL — SIGNIFICANT CHANGE UP (ref 80–100)
MCV RBC AUTO: 87 FL — SIGNIFICANT CHANGE UP (ref 80–100)
METAMYELOCYTES # FLD: 1.7 % — HIGH (ref 0–0)
MONOCYTES # BLD AUTO: 0 K/UL — SIGNIFICANT CHANGE UP (ref 0–0.9)
MONOCYTES # BLD AUTO: 1.12 K/UL — HIGH (ref 0–0.9)
MONOCYTES NFR BLD AUTO: 0 % — LOW (ref 2–14)
MONOCYTES NFR BLD AUTO: 1.1 % — LOW (ref 2–14)
MYELOCYTES NFR BLD: 0.8 % — HIGH (ref 0–0)
NEUTROPHILS # BLD AUTO: 81.04 K/UL — HIGH (ref 1.8–7.4)
NEUTROPHILS # BLD AUTO: 91.78 K/UL — HIGH (ref 1.8–7.4)
NEUTROPHILS NFR BLD AUTO: 78.4 % — HIGH (ref 43–77)
NEUTROPHILS NFR BLD AUTO: 79.7 % — HIGH (ref 43–77)
NEUTS BAND # BLD: 9.3 % — HIGH (ref 0–8)
NRBC # BLD: 0 /100 WBCS — SIGNIFICANT CHANGE UP (ref 0–0)
NRBC # BLD: 1 /100 WBCS — HIGH (ref 0–0)
PHOSPHATE SERPL-MCNC: 3.4 MG/DL — SIGNIFICANT CHANGE UP (ref 2.5–4.5)
PHOSPHATE SERPL-MCNC: 4.2 MG/DL — SIGNIFICANT CHANGE UP (ref 2.5–4.5)
PLAT MORPH BLD: NORMAL — SIGNIFICANT CHANGE UP
PLATELET # BLD AUTO: 153 K/UL — SIGNIFICANT CHANGE UP (ref 150–400)
PLATELET # BLD AUTO: 174 K/UL — SIGNIFICANT CHANGE UP (ref 150–400)
POTASSIUM SERPL-MCNC: 4.3 MMOL/L — SIGNIFICANT CHANGE UP (ref 3.5–5.3)
POTASSIUM SERPL-MCNC: 4.6 MMOL/L — SIGNIFICANT CHANGE UP (ref 3.5–5.3)
POTASSIUM SERPL-SCNC: 4.3 MMOL/L — SIGNIFICANT CHANGE UP (ref 3.5–5.3)
POTASSIUM SERPL-SCNC: 4.6 MMOL/L — SIGNIFICANT CHANGE UP (ref 3.5–5.3)
PROT SERPL-MCNC: 6.3 G/DL — SIGNIFICANT CHANGE UP (ref 6–8.3)
PROT SERPL-MCNC: 6.9 G/DL — SIGNIFICANT CHANGE UP (ref 6–8.3)
PROTHROM AB SERPL-ACNC: 11.1 SEC — SIGNIFICANT CHANGE UP (ref 9.5–13)
RBC # BLD: 2.86 M/UL — LOW (ref 4.2–5.8)
RBC # BLD: 3.01 M/UL — LOW (ref 4.2–5.8)
RBC # FLD: 18 % — HIGH (ref 10.3–14.5)
RBC # FLD: 18.1 % — HIGH (ref 10.3–14.5)
RBC BLD AUTO: SIGNIFICANT CHANGE UP
SODIUM SERPL-SCNC: 139 MMOL/L — SIGNIFICANT CHANGE UP (ref 135–145)
SODIUM SERPL-SCNC: 141 MMOL/L — SIGNIFICANT CHANGE UP (ref 135–145)
URATE SERPL-MCNC: 4.7 MG/DL — SIGNIFICANT CHANGE UP (ref 3.4–8.8)
URATE SERPL-MCNC: 5.9 MG/DL — SIGNIFICANT CHANGE UP (ref 3.4–8.8)
WBC # BLD: 103.12 K/UL — CRITICAL HIGH (ref 3.8–10.5)
WBC # BLD: 103.45 K/UL — CRITICAL HIGH (ref 3.8–10.5)
WBC # FLD AUTO: 103.12 K/UL — CRITICAL HIGH (ref 3.8–10.5)
WBC # FLD AUTO: 103.45 K/UL — CRITICAL HIGH (ref 3.8–10.5)

## 2024-01-27 PROCEDURE — 73502 X-RAY EXAM HIP UNI 2-3 VIEWS: CPT | Mod: 26,LT

## 2024-01-27 PROCEDURE — 99232 SBSQ HOSP IP/OBS MODERATE 35: CPT

## 2024-01-27 PROCEDURE — 73560 X-RAY EXAM OF KNEE 1 OR 2: CPT | Mod: 26,LT

## 2024-01-27 RX ORDER — ONDANSETRON 8 MG/1
8 TABLET, FILM COATED ORAL ONCE
Refills: 0 | Status: COMPLETED | OUTPATIENT
Start: 2024-01-27 | End: 2024-01-27

## 2024-01-27 RX ORDER — HYDROXYUREA 500 MG/1
2000 CAPSULE ORAL EVERY 12 HOURS
Refills: 0 | Status: DISCONTINUED | OUTPATIENT
Start: 2024-01-27 | End: 2024-01-28

## 2024-01-27 RX ORDER — HYDROXYUREA 500 MG/1
4 CAPSULE ORAL
Qty: 80 | Refills: 1
Start: 2024-01-27 | End: 2024-02-15

## 2024-01-27 RX ORDER — CALAMINE AND ZINC OXIDE AND PHENOL 160; 10 MG/ML; MG/ML
1 LOTION TOPICAL THREE TIMES A DAY
Refills: 0 | Status: DISCONTINUED | OUTPATIENT
Start: 2024-01-27 | End: 2024-01-28

## 2024-01-27 RX ORDER — ALLOPURINOL 300 MG
1 TABLET ORAL
Qty: 14 | Refills: 1
Start: 2024-01-27 | End: 2024-02-23

## 2024-01-27 RX ORDER — SALIVA SUBSTITUTE COMB NO.11 351 MG
5 POWDER IN PACKET (EA) MUCOUS MEMBRANE
Refills: 0 | Status: DISCONTINUED | OUTPATIENT
Start: 2024-01-27 | End: 2024-01-28

## 2024-01-27 RX ADMIN — SODIUM CHLORIDE 100 MILLILITER(S): 9 INJECTION INTRAMUSCULAR; INTRAVENOUS; SUBCUTANEOUS at 18:37

## 2024-01-27 RX ADMIN — Medication 300 MILLIGRAM(S): at 11:56

## 2024-01-27 RX ADMIN — LORATADINE 10 MILLIGRAM(S): 10 TABLET ORAL at 11:56

## 2024-01-27 RX ADMIN — HYDROMORPHONE HYDROCHLORIDE 0.5 MILLIGRAM(S): 2 INJECTION INTRAMUSCULAR; INTRAVENOUS; SUBCUTANEOUS at 14:04

## 2024-01-27 RX ADMIN — HYDROMORPHONE HYDROCHLORIDE 0.5 MILLIGRAM(S): 2 INJECTION INTRAMUSCULAR; INTRAVENOUS; SUBCUTANEOUS at 18:35

## 2024-01-27 RX ADMIN — Medication 5 MILLILITER(S): at 23:45

## 2024-01-27 RX ADMIN — HYDROMORPHONE HYDROCHLORIDE 0.5 MILLIGRAM(S): 2 INJECTION INTRAMUSCULAR; INTRAVENOUS; SUBCUTANEOUS at 06:34

## 2024-01-27 RX ADMIN — HYDROMORPHONE HYDROCHLORIDE 0.5 MILLIGRAM(S): 2 INJECTION INTRAMUSCULAR; INTRAVENOUS; SUBCUTANEOUS at 14:34

## 2024-01-27 RX ADMIN — HYDROMORPHONE HYDROCHLORIDE 0.5 MILLIGRAM(S): 2 INJECTION INTRAMUSCULAR; INTRAVENOUS; SUBCUTANEOUS at 00:18

## 2024-01-27 RX ADMIN — HYDROMORPHONE HYDROCHLORIDE 0.5 MILLIGRAM(S): 2 INJECTION INTRAMUSCULAR; INTRAVENOUS; SUBCUTANEOUS at 19:05

## 2024-01-27 RX ADMIN — DASATINIB 100 MILLIGRAM(S): 80 TABLET ORAL at 11:56

## 2024-01-27 RX ADMIN — SODIUM CHLORIDE 100 MILLILITER(S): 9 INJECTION INTRAMUSCULAR; INTRAVENOUS; SUBCUTANEOUS at 06:34

## 2024-01-27 RX ADMIN — HYDROMORPHONE HYDROCHLORIDE 0.5 MILLIGRAM(S): 2 INJECTION INTRAMUSCULAR; INTRAVENOUS; SUBCUTANEOUS at 10:41

## 2024-01-27 RX ADMIN — LIDOCAINE 1 PATCH: 4 CREAM TOPICAL at 19:35

## 2024-01-27 RX ADMIN — Medication 1 APPLICATION(S): at 06:35

## 2024-01-27 RX ADMIN — Medication 1 MILLIGRAM(S): at 11:56

## 2024-01-27 RX ADMIN — Medication 5 MILLILITER(S): at 18:37

## 2024-01-27 RX ADMIN — SERTRALINE 50 MILLIGRAM(S): 25 TABLET, FILM COATED ORAL at 11:56

## 2024-01-27 RX ADMIN — Medication 5 MILLILITER(S): at 12:01

## 2024-01-27 RX ADMIN — LIDOCAINE 1 PATCH: 4 CREAM TOPICAL at 23:44

## 2024-01-27 RX ADMIN — BUPROPION HYDROCHLORIDE 300 MILLIGRAM(S): 150 TABLET, EXTENDED RELEASE ORAL at 11:56

## 2024-01-27 RX ADMIN — LIDOCAINE 1 PATCH: 4 CREAM TOPICAL at 16:51

## 2024-01-27 RX ADMIN — CHLORHEXIDINE GLUCONATE 1 APPLICATION(S): 213 SOLUTION TOPICAL at 10:11

## 2024-01-27 RX ADMIN — HYDROXYUREA 2000 MILLIGRAM(S): 500 CAPSULE ORAL at 06:34

## 2024-01-27 RX ADMIN — HYDROXYUREA 2000 MILLIGRAM(S): 500 CAPSULE ORAL at 18:36

## 2024-01-27 RX ADMIN — ONDANSETRON 8 MILLIGRAM(S): 8 TABLET, FILM COATED ORAL at 10:37

## 2024-01-27 RX ADMIN — LIDOCAINE 1 PATCH: 4 CREAM TOPICAL at 11:57

## 2024-01-27 RX ADMIN — CALAMINE AND ZINC OXIDE AND PHENOL 1 APPLICATION(S): 160; 10 LOTION TOPICAL at 16:52

## 2024-01-27 RX ADMIN — HYDROMORPHONE HYDROCHLORIDE 0.5 MILLIGRAM(S): 2 INJECTION INTRAMUSCULAR; INTRAVENOUS; SUBCUTANEOUS at 10:11

## 2024-01-27 NOTE — PROGRESS NOTE ADULT - NS ATTEND AMEND GEN_ALL_CORE FT
.Primary:  Chitty    Vital Signs Last 24 Hrs  T(C): 36.3 (27 Jan 2024 06:35), Max: 36.9 (26 Jan 2024 17:15)  T(F): 97.3 (27 Jan 2024 06:35), Max: 98.4 (26 Jan 2024 17:15)  HR: 93 (27 Jan 2024 06:35) (82 - 99)  BP: 115/65 (27 Jan 2024 06:35) (115/65 - 125/75)  BP(mean): --  RR: 18 (27 Jan 2024 06:35) (18 - 18)  SpO2: 97% (27 Jan 2024 06:35) (94% - 99%)    Parameters below as of 27 Jan 2024 06:35  Patient On (Oxygen Delivery Method): room air    MEDICATIONS  (STANDING):  allopurinol 300 milliGRAM(s) Oral daily  buPROPion XL (24-Hour) . 300 milliGRAM(s) Oral daily  chlorhexidine 4% Liquid 1 Application(s) Topical <User Schedule>  dasatinib 100 milliGRAM(s) Oral daily  FIRST- Mouthwash  BLM 30 milliLiter(s) Swish and Spit once  folic acid 1 milliGRAM(s) Oral daily  hydrocortisone 1% Cream 1 Application(s) Topical two times a day  hydroxyurea 2000 milliGRAM(s) Oral every 8 hours  lidocaine   4% Patch 1 Patch Transdermal every 24 hours  lidocaine   4% Patch 1 Patch Transdermal daily  loratadine 10 milliGRAM(s) Oral daily  sertraline 50 milliGRAM(s) Oral daily  sodium chloride 0.9%. 1000 milliLiter(s) (100 mL/Hr) IV Continuous <Continuous>    Assessment: 28 year old admitted for CP CML -  Dasatinib started 1/25/24.    Heme:  Continue HU 2 grams q8 hours for today decrease tomorrow to prevent mucositis  continue Dasatinib.    MSK:  - Left hip pain radiates to knee / left tibia, suspect role of leukemia, however order xray films for left hip and left knee  - Right BM site pain, lidocaine patch and oxycodone as needed  - PT, encourage ambulation, likely will improve with decrease in WBC and eventual control of CML    Dispo:  - Dasatinib 100 mg daily ordered for outpatient, requires PA    Over 35 minutes were spent in direct patient care and care coordination. .Primary:  Chitty    Vital Signs Last 24 Hrs  T(C): 36.3 (27 Jan 2024 06:35), Max: 36.9 (26 Jan 2024 17:15)  T(F): 97.3 (27 Jan 2024 06:35), Max: 98.4 (26 Jan 2024 17:15)  HR: 93 (27 Jan 2024 06:35) (82 - 99)  BP: 115/65 (27 Jan 2024 06:35) (115/65 - 125/75)  BP(mean): --  RR: 18 (27 Jan 2024 06:35) (18 - 18)  SpO2: 97% (27 Jan 2024 06:35) (94% - 99%)    Parameters below as of 27 Jan 2024 06:35  Patient On (Oxygen Delivery Method): room air    MEDICATIONS  (STANDING):  allopurinol 300 milliGRAM(s) Oral daily  buPROPion XL (24-Hour) . 300 milliGRAM(s) Oral daily  chlorhexidine 4% Liquid 1 Application(s) Topical <User Schedule>  dasatinib 100 milliGRAM(s) Oral daily  FIRST- Mouthwash  BLM 30 milliLiter(s) Swish and Spit once  folic acid 1 milliGRAM(s) Oral daily  hydrocortisone 1% Cream 1 Application(s) Topical two times a day  hydroxyurea 2000 milliGRAM(s) Oral every 8 hours  lidocaine   4% Patch 1 Patch Transdermal every 24 hours  lidocaine   4% Patch 1 Patch Transdermal daily  loratadine 10 milliGRAM(s) Oral daily  sertraline 50 milliGRAM(s) Oral daily  sodium chloride 0.9%. 1000 milliLiter(s) (100 mL/Hr) IV Continuous <Continuous>    Assessment: 28 year old admitted for CP CML -  Dasatinib started 1/25/24.    Heme:  Decrease HU 2 grams q12 hours  continue Dasatinib.    MSK:  - Left hip pain radiates to knee / left tibia, suspect role of leukemia, however order xray films for left hip and left knee  - Right BM site pain, lidocaine patch and oxycodone as needed  - PT, encourage ambulation, likely will improve with decrease in WBC and eventual control of CML    Dispo:  - Dasatinib 100 mg daily ordered for outpatient, requires PA    Over 35 minutes were spent in direct patient care and care coordination.

## 2024-01-27 NOTE — PROGRESS NOTE ADULT - PROBLEM SELECTOR PLAN 1
-s/p leukapheresis 1/24/24. 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 BCR-ABL +    - s/p BMBx 1/24/24, f/u path  - Started Dasatinib 100mg 1/25/24  - f/u G6PD level  - Daily CBC w diff, coags, fibrinogen and d-dimer  - trend TLS labs bid for now (CMP, uric acid, LDH, phosph)  - If uric acid > 8, give 3mg IV Rasburicase x1; if >12 give 6mg IV x1  - c/w Allopurinol 300mg daily   - c/w IVF hydration at 100 cc/hr, daily wghts, strict I/O's  - c/w hydroxyurea 2g q8h  - 1/26 D/C'd Shiley without complications  - 1/27 Optho svc consulted. Decreased HU 2gr q12H. D/C planning for tomorrow 1/28.

## 2024-01-27 NOTE — CONSULT NOTE ADULT - SUBJECTIVE AND OBJECTIVE BOX
[facility] DEPARTMENT OF OPHTHALMOLOGY - INITIAL ADULT CONSULT  -----------------------------------------------------------------------------------------------------------------  Kirt Morris MD, PGY3  Available on [employer] Teams  -----------------------------------------------------------------------------------------------------------------    HPI:  This is a 27 y/o M with no significant PMHx who initially resented to an opthalmology visit (Dr. Aurea Arreola) for vision changes x 1 month, found to have diffuse retinal hemorrhages and sent directly to [facility] ED. Patient described both distant and near vision changes x 1 month. Also endorsed intermittent night sweats, body aches, hip pain requiring crutches for the past few months.     In the ED, T 98.1, , /75, RR 17, 99% on RA. Labs significant for .48, Hgb 8.3, uric acid 9.9. Diff significant for 41% blasts, 9% bands, 12% metamyelocytes, 2% myelocytes and 1% promyelocyte. CTH negative.     MICU consulted for central line placement for emergent leukopheresis.  (24 Jan 2024 00:40)    Interval History: patient was seen by outpatient ophthalmology on 1/23/24. While in the hospital, he was diagnosed with CML with blast crisis and is now receiving treatment. He has received meds per heme onc. Today noting that his vision is worsening. Describes that the right eye has several greyed out spots throughout and that he cannot see well centrally, that his periphery is slightly better. States that in the left eye, he is able to see okay centrally, but the periphery is more blurry and that there is a large curve line in the vision. On outpatient exam with Dr. Arreola on 1/23, patient was having similar difficulties for about a month but feels that it has gotten worse during his hospital stay. VA was listed as 20/50 OD, 20/20 OS, seeing wavy lines OD on amsler and sees line on grid OS. Also continues to endorse "static" in vision. Patient was found to have macula hemorrhages and multiple areas of diffuse hemorrhages in the periphery and heme around CWS described as not classic De La Rosa spots.    Past Medical History: ADHD, depression,   Past Ocular History: retinal hemorrhages recently found, refractive error  Drops: none  Medications: see primary note  Allergies: NKDA  Family History: glaucoma, coloboma (mother), cataracts, possible AMD (grandfather)  Surgical History: denies  Outpatient Ophthalmologist: Dr. Aurea Arreola      Review of Systems:  Constitutional: No fever, chills  Eyes: +blurry vision OD > OS, + flashes, + floaters, denies FBS, erythema, discharge, double vision, OU  Neuro: No tremors  Cardiovascular: No chest pain, palpitations  Respiratory: No SOB, no cough  GI: No nausea, vomiting, abdominal pain    Vital Signs: T(C): 36.9 (01-27-24 @ 17:54)  T(F): 98.5 (01-27-24 @ 17:54), Max: 98.5 (01-27-24 @ 17:54)  HR: 94 (01-27-24 @ 17:54) (86 - 97)  BP: 113/58 (01-27-24 @ 17:54) (113/58 - 127/71)  RR:  (18 - 18)  SpO2:  (96% - 99%)  Wt(kg): --  AAOx3    Ophthalmology Exam:  Visual acuity (cc): 20/25 (feels has to look peripheral, sees next line better than indicated line) OD, 20/20 OS  Pupils: PERRL OU, no APD  Intraocular Pressure:  iCare 10 OD, 11 OS  Extraocular movements (EOMs): Full OU, no pain, no diplopia.  Confrontational Visual Field (CVF): Full OD. Full OS.  Color Plates: 12/12 OD. 12/12 OS  Amsler: multiple small grey spots OD, central curvilinear line with storey spots OS    Pen Light Exam (PLE)  External: Normal OU.  Lids/Lashes/Lacrimal Ducts: Flat OU.  Sclera/Conjunctiva: White and quiet OU.  Cornea: DTBUT OU  Anterior Chamber: Deep and formed OU.    Iris: Flat OU.  Lens: Clear OU.    Fundus Exam: dilated with 1% tropicamide and 2.5% phenylephrine  Approval obtained from primary team for dilation  Patient aware that pupils can remained dilated for at least 4-6 hours.  Exam performed with 20 D lens    Vitreous: wnl OU  Disc, cup/disc: sharp and pink, 0.3 OU, no edema, no hemorrhages, normal color  Macula: significant bleeding centrally overlying fovea OU  Vessels: tortuous vessels OU  Periphery: significant retinal hemorrhages and CWS, De La Rosa spot appearing lesions OU, lattice degeneration temporally OS    Labs/Imaging:  +< from: CT Head No Cont (01.24.24 @ 02:00) >    FINDINGS:  Intracranial Contents:    The density of the brain is appropriate for age. Gray-white   differentiation is preserved. No hydrocephalus. The basilar cisterns are   clear. No focal edema or acute mass effect. There is no intracranial   fluid collection or acute hemorrhage.    Bones and Extracranial Soft Tissues:    There is no fracture identified. The visualized paranasal sinuses have   minimal mucosal thickening and the mastoid air cellsare clear. Scalp and   imaged facial soft tissues are within normal limits.      IMPRESSION:  1. Unremarkable, unenhanced CT imaging of the brain for the patient's age.    < end of copied text >

## 2024-01-27 NOTE — PROVIDER CONTACT NOTE (OTHER) - ACTION/TREATMENT ORDERED:
no new orders OK to not redraw labs
Provider to patient bedside, give Dilaudid IV as ordered, notify provider If pain persists. Biotene mouth rinse for dry mouth.

## 2024-01-27 NOTE — PROVIDER CONTACT NOTE (CRITICAL VALUE NOTIFICATION) - ASSESSMENT
Pt A&Ox4, vital signs stable, afebrile. Pt denies discomfort, N/V/D, SOB, chest pain. Pain adequately controlled at this time. Pt does not appear to be in acute distress at this time.
Pt A&Ox4, vital signs stable, afebrile. Pt denies, N/V/D, SOB, chest pain. Pain symptoms being managed as needed. Pt does not appear to be in acute distress at this time.
VSS, NAD
Pt A&Ox4, vital signs stable, afebrile at this time. Pt denies pain/discomfort, N/V/D, SOB, chest pain at this time. No s/s of bleeding noted at this time. Pt does not appear to be in acute distress at this time.
Pt awake alert oriented x4 VSS no complaints

## 2024-01-27 NOTE — PROVIDER CONTACT NOTE (OTHER) - ASSESSMENT
VSS, No acute distress noted.
Pt awake alert oriented VSS. Slight blood noted at insertion site of Shiley

## 2024-01-27 NOTE — PROGRESS NOTE ADULT - SUBJECTIVE AND OBJECTIVE BOX
Diagnosis: r/o CML    Protocol/Chemo Regimen: Dasatinib    Day: 3    Subjective: afebrile, no overnight events. +Worsened vision OD.  +R LBP (at bx site). +OOB walking    Review of Systems: Rest of ROS negative                     Diet: regular    Allergies    No Known Allergies    Intolerances      ANTIMICROBIALS    HEME/ONC MEDICATIONS  dasatinib 100 milliGRAM(s) Oral daily  enoxaparin Injectable 40 milliGRAM(s) SubCutaneous every 24 hours  hydroxyurea 2000 milliGRAM(s) Oral every 8 hours    STANDING MEDICATIONS  allopurinol 300 milliGRAM(s) Oral daily  buPROPion XL (24-Hour) . 300 milliGRAM(s) Oral daily  chlorhexidine 4% Liquid 1 Application(s) Topical <User Schedule>  folic acid 1 milliGRAM(s) Oral daily  lactated ringers. 1000 milliLiter(s) IV Continuous <Continuous>  lidocaine   4% Patch 1 Patch Transdermal every 24 hours  lidocaine   4% Patch 1 Patch Transdermal daily  loratadine 10 milliGRAM(s) Oral daily  sertraline 50 milliGRAM(s) Oral daily    PRN MEDICATIONS  HYDROmorphone  Injectable 0.5 milliGRAM(s) IV Push every 4 hours PRN  sodium chloride 0.9% lock flush 10 milliLiter(s) IV Push every 1 hour PRN    Vital Signs Last 24 Hrs  T(C): 36.9 (27 Jan 2024 17:54), Max: 36.9 (27 Jan 2024 17:54)  T(F): 98.5 (27 Jan 2024 17:54), Max: 98.5 (27 Jan 2024 17:54)  HR: 94 (27 Jan 2024 17:54) (86 - 97)  BP: 113/58 (27 Jan 2024 17:54) (113/58 - 127/71)  BP(mean): --  RR: 18 (27 Jan 2024 17:54) (18 - 18)  SpO2: 97% (27 Jan 2024 17:54) (96% - 99%)    Parameters below as of 27 Jan 2024 17:54  Patient On (Oxygen Delivery Method): room air      PHYSICAL EXAM  General: Sitting up in bed, NAD  HEENT: Sclerae anicteric, no oral lesions  Neck: R shiley site, mild oozing in dressing, +small hematoma (~1cm x 1.5cm)  CV: +S1,S2, reg, no mur apprec  Lungs: CTA b/l  Abdomen: +BS, soft, NT/ND  Ext: no edema BLE's  Back: R BMBx without swelling or induration, erythema or bleeding,  mild ttp  MSK: 5/5 RLE strength, sensory intact  Skin: no rashes  Neuro: A&O x 3, non focal  PIV: cdi     LABS:                        8.2    103.45 )-----------( 174      ( 27 Jan 2024 18:07 )             26.2     27 Jan 2024 07:10    141    |  107    |  16     ----------------------------<  99     4.3     |  22     |  0.84     Ca    8.7        27 Jan 2024 07:10  Phos  4.2       27 Jan 2024 07:10  Mg     2.4       27 Jan 2024 07:10    TPro  6.3    /  Alb  3.8    /  TBili  0.4    /  DBili  x      /  AST  28     /  ALT  21     /  AlkPhos  61     27 Jan 2024 07:10    PT/INR - ( 27 Jan 2024 07:13 )   PT: 11.1 sec;   INR: 1.01 ratio    PTT - ( 27 Jan 2024 07:13 )  PTT:27.3 sec      LIVER FUNCTIONS - ( 27 Jan 2024 07:10 )  Alb: 3.8 g/dL / Pro: 6.3 g/dL / ALK PHOS: 61 U/L / ALT: 21 U/L / AST: 28 U/L / GGT: x           Radiology:    < from: US Abdomen Complete (US Abdomen Complete .) (01.24.24 @ 16:40) >  IMPRESSION:  Splenomegaly. No focal splenic lesion.  Otherwise, essentially unremarkable abdominal ultrasound.      < from: CT Head No Cont (01.24.24 @ 02:00) >  IMPRESSION:  1. Unremarkable, unenhanced CT imaging of the brain for the patient's age.

## 2024-01-27 NOTE — PROVIDER CONTACT NOTE (CRITICAL VALUE NOTIFICATION) - ACTION/TREATMENT ORDERED:
No new orders at this time.
No new orders at this time
no new orders

## 2024-01-27 NOTE — PROVIDER CONTACT NOTE (CRITICAL VALUE NOTIFICATION) - BACKGROUND
Pt with new diagnosis CML s/p leukopherisis
pt dx with CML
pt dx with CML
Pt dx with CML, admitted for treatment
CML

## 2024-01-27 NOTE — CONSULT NOTE ADULT - ASSESSMENT
Assessment and Recommendations:  28y male with a past medical history/ocular history of ADHD, depression, newly diagnosed CML w/ blast crisis, recent ophthalmic exam significant for significant macular and peripheral retinal hemorrhages and CWS consulted for worsening blurry vision, found to have similar findings to outpatient evaluation consistent with leukemic retinopathy.    #Leukemic retinopathy, OU  - patient with similar findings to outpatient examination on 1/23 by Dr. Chang  - VA on 1/23 was 20/50 OD, 20/20 OS significant hemorrhage in macula on OCT macula involving fovea, as well as multiple retinal hemorrhages and CWS in the periphery of both eyes, patient also had mild distortion on amsler grid OU  - today VA 20/25 OD, 20/20 OS, consistent and slightly improved from prior examination  - IOP wnl, EOMI, CVF full OU (though has some difficulty in nasal fields, but intact), no rAPD appreciated, color vision is full and intact  - anterior exam with decreased tear breakup time otherwise within normal limits  - dilated exam with significant heme preretinal over fovea and macula, as well as diffuse retinal hemorrhages in the periphery, not consistent with leukemic infiltration of the nerve or with CRVO appears to be primarily leukemic retinopathy changes along with preretinal hemorrhage/retinal hemorrhage overlying fovea  - receiving treatment per heme/onc for CML  - no acute intervention from ophthalmology but needs appropriate retina follow up as may require radiation therapy or other retinal interventions if hemorrhages do not regress with CML treatment  - patient precautions given including flashes, floaters, curtain falling over vision, transient vision loss or blackout, decreased color perception  - ophthalmology will redilate if still admitted during this admission    Outpatient Follow-up: Patient should follow-up with his/her ophthalmologist or with [facility] Department of Ophthalmology within 1 week of after discharge at:    [address]  [place] [address]  [phone]    Kirt Morris MD, PGY3  Also available on Microsoft Teams

## 2024-01-28 ENCOUNTER — TRANSCRIPTION ENCOUNTER (OUTPATIENT)
Age: 29
End: 2024-01-28

## 2024-01-28 VITALS
RESPIRATION RATE: 18 BRPM | SYSTOLIC BLOOD PRESSURE: 119 MMHG | WEIGHT: 183.2 LBS | TEMPERATURE: 99 F | DIASTOLIC BLOOD PRESSURE: 75 MMHG | OXYGEN SATURATION: 96 % | HEART RATE: 83 BPM

## 2024-01-28 LAB
ALBUMIN SERPL ELPH-MCNC: 3.6 G/DL — SIGNIFICANT CHANGE UP (ref 3.3–5)
ALP SERPL-CCNC: 61 U/L — SIGNIFICANT CHANGE UP (ref 40–120)
ALT FLD-CCNC: 25 U/L — SIGNIFICANT CHANGE UP (ref 10–45)
ANION GAP SERPL CALC-SCNC: 10 MMOL/L — SIGNIFICANT CHANGE UP (ref 5–17)
APTT BLD: 26.9 SEC — SIGNIFICANT CHANGE UP (ref 24.5–35.6)
AST SERPL-CCNC: 25 U/L — SIGNIFICANT CHANGE UP (ref 10–40)
BASOPHILS # BLD AUTO: 3.35 K/UL — HIGH (ref 0–0.2)
BASOPHILS NFR BLD AUTO: 5.2 % — HIGH (ref 0–2)
BILIRUB SERPL-MCNC: 0.3 MG/DL — SIGNIFICANT CHANGE UP (ref 0.2–1.2)
BUN SERPL-MCNC: 16 MG/DL — SIGNIFICANT CHANGE UP (ref 7–23)
CALCIUM SERPL-MCNC: 8.3 MG/DL — LOW (ref 8.4–10.5)
CHLORIDE SERPL-SCNC: 105 MMOL/L — SIGNIFICANT CHANGE UP (ref 96–108)
CO2 SERPL-SCNC: 22 MMOL/L — SIGNIFICANT CHANGE UP (ref 22–31)
CREAT SERPL-MCNC: 0.75 MG/DL — SIGNIFICANT CHANGE UP (ref 0.5–1.3)
D DIMER BLD IA.RAPID-MCNC: 561 NG/ML DDU — HIGH
EGFR: 124 ML/MIN/1.73M2 — SIGNIFICANT CHANGE UP
EOSINOPHIL # BLD AUTO: 0.58 K/UL — HIGH (ref 0–0.5)
EOSINOPHIL NFR BLD AUTO: 0.9 % — SIGNIFICANT CHANGE UP (ref 0–6)
FIBRINOGEN PPP-MCNC: 461 MG/DL — HIGH (ref 200–445)
GLUCOSE SERPL-MCNC: 95 MG/DL — SIGNIFICANT CHANGE UP (ref 70–99)
HCT VFR BLD CALC: 22.6 % — LOW (ref 39–50)
HEMATOPATHOLOGY REPORT: SIGNIFICANT CHANGE UP
HGB BLD-MCNC: 7.1 G/DL — LOW (ref 13–17)
INR BLD: 1.05 RATIO — SIGNIFICANT CHANGE UP (ref 0.85–1.18)
INTERPRETATION SERPL IFE-IMP: SIGNIFICANT CHANGE UP
LDH SERPL L TO P-CCNC: 732 U/L — HIGH (ref 50–242)
LYMPHOCYTES # BLD AUTO: 1.67 K/UL — SIGNIFICANT CHANGE UP (ref 1–3.3)
LYMPHOCYTES # BLD AUTO: 2.6 % — LOW (ref 13–44)
MAGNESIUM SERPL-MCNC: 2.4 MG/DL — SIGNIFICANT CHANGE UP (ref 1.6–2.6)
MANUAL SMEAR VERIFICATION: SIGNIFICANT CHANGE UP
MCHC RBC-ENTMCNC: 27.6 PG — SIGNIFICANT CHANGE UP (ref 27–34)
MCHC RBC-ENTMCNC: 31.4 GM/DL — LOW (ref 32–36)
MCV RBC AUTO: 87.9 FL — SIGNIFICANT CHANGE UP (ref 80–100)
MONOCYTES # BLD AUTO: 0.58 K/UL — SIGNIFICANT CHANGE UP (ref 0–0.9)
MONOCYTES NFR BLD AUTO: 0.9 % — LOW (ref 2–14)
NEUTROPHILS # BLD AUTO: 58.21 K/UL — HIGH (ref 1.8–7.4)
NEUTROPHILS NFR BLD AUTO: 89.5 % — HIGH (ref 43–77)
NEUTS BAND # BLD: 0.9 % — SIGNIFICANT CHANGE UP (ref 0–8)
PHOSPHATE SERPL-MCNC: 3.5 MG/DL — SIGNIFICANT CHANGE UP (ref 2.5–4.5)
PLAT MORPH BLD: NORMAL — SIGNIFICANT CHANGE UP
PLATELET # BLD AUTO: 141 K/UL — LOW (ref 150–400)
POTASSIUM SERPL-MCNC: 4.7 MMOL/L — SIGNIFICANT CHANGE UP (ref 3.5–5.3)
POTASSIUM SERPL-SCNC: 4.7 MMOL/L — SIGNIFICANT CHANGE UP (ref 3.5–5.3)
PROT SERPL-MCNC: 6.4 G/DL — SIGNIFICANT CHANGE UP (ref 6–8.3)
PROTHROM AB SERPL-ACNC: 11 SEC — SIGNIFICANT CHANGE UP (ref 9.5–13)
RBC # BLD: 2.57 M/UL — LOW (ref 4.2–5.8)
RBC # FLD: 18.1 % — HIGH (ref 10.3–14.5)
RBC BLD AUTO: SIGNIFICANT CHANGE UP
SODIUM SERPL-SCNC: 137 MMOL/L — SIGNIFICANT CHANGE UP (ref 135–145)
URATE SERPL-MCNC: 5.2 MG/DL — SIGNIFICANT CHANGE UP (ref 3.4–8.8)
VISC SER: 1.5 — SIGNIFICANT CHANGE UP (ref 1.4–2)
WBC # BLD: 64.39 K/UL — CRITICAL HIGH (ref 3.8–10.5)
WBC # FLD AUTO: 64.39 K/UL — CRITICAL HIGH (ref 3.8–10.5)

## 2024-01-28 PROCEDURE — 84100 ASSAY OF PHOSPHORUS: CPT

## 2024-01-28 PROCEDURE — 84550 ASSAY OF BLOOD/URIC ACID: CPT

## 2024-01-28 PROCEDURE — 82330 ASSAY OF CALCIUM: CPT

## 2024-01-28 PROCEDURE — 82435 ASSAY OF BLOOD CHLORIDE: CPT

## 2024-01-28 PROCEDURE — 85610 PROTHROMBIN TIME: CPT

## 2024-01-28 PROCEDURE — 83550 IRON BINDING TEST: CPT

## 2024-01-28 PROCEDURE — 36415 COLL VENOUS BLD VENIPUNCTURE: CPT

## 2024-01-28 PROCEDURE — 81246 FLT3 GENE ANALYSIS: CPT

## 2024-01-28 PROCEDURE — 88360 TUMOR IMMUNOHISTOCHEM/MANUAL: CPT

## 2024-01-28 PROCEDURE — 83615 LACTATE (LD) (LDH) ENZYME: CPT

## 2024-01-28 PROCEDURE — 88275 CYTOGENETICS 100-300: CPT

## 2024-01-28 PROCEDURE — 84295 ASSAY OF SERUM SODIUM: CPT

## 2024-01-28 PROCEDURE — 70450 CT HEAD/BRAIN W/O DYE: CPT | Mod: MA

## 2024-01-28 PROCEDURE — P9041: CPT

## 2024-01-28 PROCEDURE — 86901 BLOOD TYPING SEROLOGIC RH(D): CPT

## 2024-01-28 PROCEDURE — 71046 X-RAY EXAM CHEST 2 VIEWS: CPT

## 2024-01-28 PROCEDURE — 81206 BCR/ABL1 GENE MAJOR BP: CPT

## 2024-01-28 PROCEDURE — 85014 HEMATOCRIT: CPT

## 2024-01-28 PROCEDURE — 85379 FIBRIN DEGRADATION QUANT: CPT

## 2024-01-28 PROCEDURE — 71045 X-RAY EXAM CHEST 1 VIEW: CPT

## 2024-01-28 PROCEDURE — 86900 BLOOD TYPING SEROLOGIC ABO: CPT

## 2024-01-28 PROCEDURE — 99291 CRITICAL CARE FIRST HOUR: CPT

## 2024-01-28 PROCEDURE — 88264 CHROMOSOME ANALYSIS 20-25: CPT

## 2024-01-28 PROCEDURE — 80053 COMPREHEN METABOLIC PANEL: CPT

## 2024-01-28 PROCEDURE — 86334 IMMUNOFIX E-PHORESIS SERUM: CPT

## 2024-01-28 PROCEDURE — 82746 ASSAY OF FOLIC ACID SERUM: CPT

## 2024-01-28 PROCEDURE — 82955 ASSAY OF G6PD ENZYME: CPT

## 2024-01-28 PROCEDURE — 99239 HOSP IP/OBS DSCHRG MGMT >30: CPT

## 2024-01-28 PROCEDURE — 73560 X-RAY EXAM OF KNEE 1 OR 2: CPT

## 2024-01-28 PROCEDURE — 88305 TISSUE EXAM BY PATHOLOGIST: CPT

## 2024-01-28 PROCEDURE — 80074 ACUTE HEPATITIS PANEL: CPT

## 2024-01-28 PROCEDURE — 88237 TISSUE CULTURE BONE MARROW: CPT

## 2024-01-28 PROCEDURE — 82803 BLOOD GASES ANY COMBINATION: CPT

## 2024-01-28 PROCEDURE — 83735 ASSAY OF MAGNESIUM: CPT

## 2024-01-28 PROCEDURE — 86706 HEP B SURFACE ANTIBODY: CPT

## 2024-01-28 PROCEDURE — 85730 THROMBOPLASTIN TIME PARTIAL: CPT

## 2024-01-28 PROCEDURE — 88313 SPECIAL STAINS GROUP 2: CPT

## 2024-01-28 PROCEDURE — 97161 PT EVAL LOW COMPLEX 20 MIN: CPT

## 2024-01-28 PROCEDURE — 76700 US EXAM ABDOM COMPLETE: CPT

## 2024-01-28 PROCEDURE — 94660 CPAP INITIATION&MGMT: CPT

## 2024-01-28 PROCEDURE — 88342 IMHCHEM/IMCYTCHM 1ST ANTB: CPT

## 2024-01-28 PROCEDURE — 85048 AUTOMATED LEUKOCYTE COUNT: CPT

## 2024-01-28 PROCEDURE — 88341 IMHCHEM/IMCYTCHM EA ADD ANTB: CPT

## 2024-01-28 PROCEDURE — 85097 BONE MARROW INTERPRETATION: CPT

## 2024-01-28 PROCEDURE — 88185 FLOWCYTOMETRY/TC ADD-ON: CPT

## 2024-01-28 PROCEDURE — 88271 CYTOGENETICS DNA PROBE: CPT

## 2024-01-28 PROCEDURE — 87205 SMEAR GRAM STAIN: CPT

## 2024-01-28 PROCEDURE — 73502 X-RAY EXAM HIP UNI 2-3 VIEWS: CPT

## 2024-01-28 PROCEDURE — 83540 ASSAY OF IRON: CPT

## 2024-01-28 PROCEDURE — 82728 ASSAY OF FERRITIN: CPT

## 2024-01-28 PROCEDURE — 88184 FLOWCYTOMETRY/ TC 1 MARKER: CPT

## 2024-01-28 PROCEDURE — 85018 HEMOGLOBIN: CPT

## 2024-01-28 PROCEDURE — 83605 ASSAY OF LACTIC ACID: CPT

## 2024-01-28 PROCEDURE — 81245 FLT3 GENE: CPT

## 2024-01-28 PROCEDURE — 83010 ASSAY OF HAPTOGLOBIN QUANT: CPT

## 2024-01-28 PROCEDURE — 86850 RBC ANTIBODY SCREEN: CPT

## 2024-01-28 PROCEDURE — 85384 FIBRINOGEN ACTIVITY: CPT

## 2024-01-28 PROCEDURE — 82947 ASSAY GLUCOSE BLOOD QUANT: CPT

## 2024-01-28 PROCEDURE — 81207 BCR/ABL1 GENE MINOR BP: CPT

## 2024-01-28 PROCEDURE — 85045 AUTOMATED RETICULOCYTE COUNT: CPT

## 2024-01-28 PROCEDURE — 85025 COMPLETE CBC W/AUTO DIFF WBC: CPT

## 2024-01-28 PROCEDURE — 82607 VITAMIN B-12: CPT

## 2024-01-28 PROCEDURE — 85027 COMPLETE CBC AUTOMATED: CPT

## 2024-01-28 PROCEDURE — 84132 ASSAY OF SERUM POTASSIUM: CPT

## 2024-01-28 PROCEDURE — 36511 APHERESIS WBC: CPT

## 2024-01-28 PROCEDURE — 88280 CHROMOSOME KARYOTYPE STUDY: CPT

## 2024-01-28 PROCEDURE — 85810 BLOOD VISCOSITY EXAMINATION: CPT

## 2024-01-28 RX ORDER — HYDROXYUREA 500 MG/1
1000 CAPSULE ORAL EVERY 12 HOURS
Refills: 0 | Status: DISCONTINUED | OUTPATIENT
Start: 2024-01-28 | End: 2024-01-28

## 2024-01-28 RX ORDER — HYDROXYUREA 500 MG/1
2 CAPSULE ORAL
Qty: 40 | Refills: 1
Start: 2024-01-28 | End: 2024-02-16

## 2024-01-28 RX ADMIN — Medication 5 MILLILITER(S): at 11:38

## 2024-01-28 RX ADMIN — DASATINIB 100 MILLIGRAM(S): 80 TABLET ORAL at 11:40

## 2024-01-28 RX ADMIN — LIDOCAINE 1 PATCH: 4 CREAM TOPICAL at 11:39

## 2024-01-28 RX ADMIN — SERTRALINE 50 MILLIGRAM(S): 25 TABLET, FILM COATED ORAL at 11:40

## 2024-01-28 RX ADMIN — LORATADINE 10 MILLIGRAM(S): 10 TABLET ORAL at 11:40

## 2024-01-28 RX ADMIN — HYDROXYUREA 2000 MILLIGRAM(S): 500 CAPSULE ORAL at 06:00

## 2024-01-28 RX ADMIN — BUPROPION HYDROCHLORIDE 300 MILLIGRAM(S): 150 TABLET, EXTENDED RELEASE ORAL at 11:40

## 2024-01-28 RX ADMIN — Medication 5 MILLILITER(S): at 06:00

## 2024-01-28 RX ADMIN — CHLORHEXIDINE GLUCONATE 1 APPLICATION(S): 213 SOLUTION TOPICAL at 06:06

## 2024-01-28 RX ADMIN — Medication 1 MILLIGRAM(S): at 11:40

## 2024-01-28 RX ADMIN — Medication 300 MILLIGRAM(S): at 11:40

## 2024-01-28 NOTE — PROGRESS NOTE ADULT - PROBLEM SELECTOR PLAN 1
-s/p leukapheresis 1/24/24. 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 BCR-ABL +    - s/p BMBx 1/24/24, f/u path  - Started Dasatinib 100mg 1/25/24  - f/u G6PD level  - Daily CBC w diff, coags, fibrinogen and d-dimer  - trend TLS labs bid for now (CMP, uric acid, LDH, phosph)  - If uric acid > 8, give 3mg IV Rasburicase x1; if >12 give 6mg IV x1  - c/w Allopurinol 300mg daily   - c/w IVF hydration at 100 cc/hr, daily wghts, strict I/O's  - c/w hydroxyurea 2g q8h  - 1/26 D/C'd [facility] without complications  - 1/27 Optho svc consulted. Decreased HU 2gr q12H. D/C planning for tomorrow 1/28.  1/28 Decreased Hydrea to 1gm BID. Discharge home with outpatient followup. Await Insurance approval of Dasatinib

## 2024-01-28 NOTE — DISCHARGE NOTE NURSING/CASE MANAGEMENT/SOCIAL WORK - PATIENT PORTAL LINK FT
You can access the FollowMyHealth Patient Portal offered by [facility] by registering at the following website: [email or web address]. By joining [facility]’s FollowMyHealth portal, you will also be able to view your health information using other applications (apps) compatible with our system.

## 2024-01-28 NOTE — PROGRESS NOTE ADULT - REASON FOR ADMISSION
Hyperleukocytosis

## 2024-01-28 NOTE — PROGRESS NOTE ADULT - ASSESSMENT
27 y/o gentleman with PMH of ADHD and depression p/w night sweats x 1 year, unintentional weight loss x6 mo, decreased vision x 1.5 months, found to have hyperleukocytosis 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

## 2024-01-28 NOTE — PROGRESS NOTE ADULT - NS ATTEST RISK PROBLEM GEN_ALL_CORE FT
on active treatment for CML, requires monitoring and management of side effects, cytopenias, infections, bleeding and other complications.

## 2024-01-28 NOTE — PROGRESS NOTE ADULT - PROBLEM SELECTOR PLAN 3
Not neutropenic  If spikes temp, pancx, consider starting abx

## 2024-01-28 NOTE — PROGRESS NOTE ADULT - PROBLEM SELECTOR PLAN 2
Continue current meds (Buproprion, Sertraline)

## 2024-01-28 NOTE — PROGRESS NOTE ADULT - SUBJECTIVE AND OBJECTIVE BOX
Diagnosis: CML    Protocol/Chemo Regimen: Dasatinib    Day: 4    Subjective: no acute complaints - no acute change in vision     Review of Systems: Rest of ROS negative                     Diet: regular    Allergies: No Known Allergies    HEME/ONC MEDICATIONS  dasatinib 100 milliGRAM(s) Oral daily  hydroxyurea 1000 milliGRAM(s) Oral every 12 hours    STANDING MEDICATIONS  allopurinol 300 milliGRAM(s) Oral daily  Biotene Dry Mouth Oral Rinse 5 milliLiter(s) Swish and Spit four times a day  buPROPion XL (24-Hour) . 300 milliGRAM(s) Oral daily  calamine/zinc oxide Lotion 1 Application(s) Topical three times a day  chlorhexidine 4% Liquid 1 Application(s) Topical <User Schedule>  FIRST- Mouthwash  BLM 30 milliLiter(s) Swish and Spit once  folic acid 1 milliGRAM(s) Oral daily  hydrocortisone 1% Cream 1 Application(s) Topical two times a day  lidocaine   4% Patch 1 Patch Transdermal every 24 hours  lidocaine   4% Patch 1 Patch Transdermal daily  loratadine 10 milliGRAM(s) Oral daily  sertraline 50 milliGRAM(s) Oral daily  sodium chloride 0.9%. 1000 milliLiter(s) IV Continuous <Continuous>    PRN MEDICATIONS  HYDROmorphone  Injectable 0.5 milliGRAM(s) IV Push every 4 hours PRN  sodium chloride 0.9% lock flush 10 milliLiter(s) IV Push every 1 hour PRN    Vital Signs Last 24 Hrs  T(C): 37.1 (28 Jan 2024 08:58), Max: 37.1 (28 Jan 2024 08:58)  T(F): 98.8 (28 Jan 2024 08:58), Max: 98.8 (28 Jan 2024 08:58)  HR: 83 (28 Jan 2024 08:58) (83 - 102)  BP: 119/75 (28 Jan 2024 08:58) (108/67 - 132/72)  BP(mean): --  RR: 18 (28 Jan 2024 08:58) (16 - 18)  SpO2: 96% (28 Jan 2024 08:58) (95% - 99%)    Parameters below as of 28 Jan 2024 08:58  Patient On (Oxygen Delivery Method): room air    PHYSICAL EXAM  General: adult in NAD  HEENT: clear oropharynx, no erythema, no ulcers  Neck: supple  CV: normal S1, S2, RRR  Lungs: clear to auscultation, no wheezes, no rales  Abdomen: soft, nontender, nondistended, normal BS  Ext: no edema  Skin: no rash  Neuro: alert and oriented x 3    LABS:                        7.1    64.39 )-----------( 141      ( 28 Jan 2024 08:25 )             22.6     Mean Cell Volume : 87.9 fl  Mean Cell Hemoglobin : 27.6 pg  Mean Cell Hemoglobin Concentration : 31.4 gm/dL  Auto Neutrophil # : x  Auto Lymphocyte # : x  Auto Monocyte # : x  Auto Eosinophil # : x  Auto Basophil # : x  Auto Neutrophil % : x  Auto Lymphocyte % : x  Auto Monocyte % : x  Auto Eosinophil % : x  Auto Basophil % : x    01-28    137  |  105  |  16  ----------------------------<  95  4.7   |  22  |  0.75    Ca    8.3<L>      28 Jan 2024 08:25  Phos  3.5     01-28  Mg     2.4     01-28    TPro  6.4  /  Alb  3.6  /  TBili  0.3  /  DBili  x   /  AST  25  /  ALT  25  /  AlkPhos  61  01-28    Mg 2.4  Phos 3.5  Mg 2.3  Phos 3.4    PT/INR - ( 28 Jan 2024 08:25 )   PT: 11.0 sec;   INR: 1.05 ratio      PTT - ( 28 Jan 2024 08:25 )  PTT:26.9 sec    Uric Acid 5.2    Uric Acid 4.7

## 2024-01-28 NOTE — PROGRESS NOTE ADULT - NS ATTEND AMEND GEN_ALL_CORE FT
.Primary:  Chitty    Vital Signs Last 24 Hrs  T(C): 36.5 (28 Jan 2024 06:20), Max: 36.9 (27 Jan 2024 17:54)  T(F): 97.7 (28 Jan 2024 06:20), Max: 98.5 (27 Jan 2024 17:54)  HR: 88 (28 Jan 2024 06:20) (88 - 102)  BP: 118/67 (28 Jan 2024 06:20) (108/67 - 132/72)  BP(mean): --  RR: 18 (28 Jan 2024 06:20) (16 - 18)  SpO2: 99% (28 Jan 2024 06:20) (95% - 99%)    Parameters below as of 28 Jan 2024 06:20  Patient On (Oxygen Delivery Method): room air    MEDICATIONS  (STANDING):  allopurinol 300 milliGRAM(s) Oral daily  Biotene Dry Mouth Oral Rinse 5 milliLiter(s) Swish and Spit four times a day  buPROPion XL (24-Hour) . 300 milliGRAM(s) Oral daily  calamine/zinc oxide Lotion 1 Application(s) Topical three times a day  chlorhexidine 4% Liquid 1 Application(s) Topical <User Schedule>  dasatinib 100 milliGRAM(s) Oral daily  FIRST- Mouthwash  BLM 30 milliLiter(s) Swish and Spit once  folic acid 1 milliGRAM(s) Oral daily  hydrocortisone 1% Cream 1 Application(s) Topical two times a day  hydroxyurea 2000 milliGRAM(s) Oral every 12 hours  lidocaine   4% Patch 1 Patch Transdermal every 24 hours  lidocaine   4% Patch 1 Patch Transdermal daily  loratadine 10 milliGRAM(s) Oral daily  sertraline 50 milliGRAM(s) Oral daily  sodium chloride 0.9%. 1000 milliLiter(s) (100 mL/Hr) IV Continuous <Continuous>      Assessment: 28 year old admitted for CP CML -  Dasatinib started 1/25/24.    Heme:  Decrease HU 1 grams q12 hours  continue Dasatinib - today; this medication is being applied for through in insurance  D/C folic acid   Continue allopurinol (7 day supple)     MSK:  - Left hip pain radiates to knee / left tibia, suspect role of leukemia, however order xray films for left hip and left knee  - Right BM site pain, lidocaine patch and oxycodone as needed  - PT, encourage ambulation, likely will improve with decrease in WBC and eventual control of CML    Dispo:  - Dasatinib 100 mg daily ordered for outpatient, requires PA    Over 30 minutes were spent discharge management. Stable for discharge on HU while TKI is being processed.  Please arrange follow-up with Dr. Riojas. .Primary:  Chitty    Vital Signs Last 24 Hrs  T(C): 36.5 (28 Jan 2024 06:20), Max: 36.9 (27 Jan 2024 17:54)  T(F): 97.7 (28 Jan 2024 06:20), Max: 98.5 (27 Jan 2024 17:54)  HR: 88 (28 Jan 2024 06:20) (88 - 102)  BP: 118/67 (28 Jan 2024 06:20) (108/67 - 132/72)  BP(mean): --  RR: 18 (28 Jan 2024 06:20) (16 - 18)  SpO2: 99% (28 Jan 2024 06:20) (95% - 99%)    Parameters below as of 28 Jan 2024 06:20  Patient On (Oxygen Delivery Method): room air    MEDICATIONS  (STANDING):  allopurinol 300 milliGRAM(s) Oral daily  Biotene Dry Mouth Oral Rinse 5 milliLiter(s) Swish and Spit four times a day  buPROPion XL (24-Hour) . 300 milliGRAM(s) Oral daily  calamine/zinc oxide Lotion 1 Application(s) Topical three times a day  chlorhexidine 4% Liquid 1 Application(s) Topical <User Schedule>  dasatinib 100 milliGRAM(s) Oral daily  FIRST- Mouthwash  BLM 30 milliLiter(s) Swish and Spit once  folic acid 1 milliGRAM(s) Oral daily  hydrocortisone 1% Cream 1 Application(s) Topical two times a day  hydroxyurea 2000 milliGRAM(s) Oral every 12 hours  lidocaine   4% Patch 1 Patch Transdermal every 24 hours  lidocaine   4% Patch 1 Patch Transdermal daily  loratadine 10 milliGRAM(s) Oral daily  sertraline 50 milliGRAM(s) Oral daily  sodium chloride 0.9%. 1000 milliLiter(s) (100 mL/Hr) IV Continuous <Continuous>      Assessment: 28 year old admitted for CP CML -  Dasatinib started 1/25/24.    Heme:  Decrease HU 1 grams q12 hours  continue Dasatinib - today; this medication is being applied for through in insurance  D/C folic acid   Continue allopurinol (7 days)     MSK:  - Left hip pain radiates to knee / left tibia, suspect role of leukemia, however order xray films for left hip and left knee  - Right BM site pain, lidocaine patch and oxycodone as needed  - PT, encourage ambulation, likely will improve with decrease in WBC and eventual control of CML    Dispo:  - Dasatinib 100 mg daily ordered for outpatient, requires PA    Over 30 minutes were spent discharge management. Stable for discharge on HU while TKI is being processed.  Please arrange follow-up with Dr. Riojas.

## 2024-01-29 ENCOUNTER — APPOINTMENT (OUTPATIENT)
Age: 29
End: 2024-01-29
Payer: COMMERCIAL

## 2024-01-29 ENCOUNTER — TRANSCRIPTION ENCOUNTER (OUTPATIENT)
Age: 29
End: 2024-01-29

## 2024-01-29 DIAGNOSIS — Z00.00 ENCOUNTER FOR GENERAL ADULT MEDICAL EXAMINATION W/OUT ABNORMAL FINDINGS: ICD-10-CM

## 2024-01-29 DIAGNOSIS — F32.A ANXIETY DISORDER, UNSPECIFIED: ICD-10-CM

## 2024-01-29 DIAGNOSIS — F41.9 ANXIETY DISORDER, UNSPECIFIED: ICD-10-CM

## 2024-01-29 DIAGNOSIS — G89.18 OTHER ACUTE POSTPROCEDURAL PAIN: ICD-10-CM

## 2024-01-29 LAB
CHROM ANALY OVERALL INTERP SPEC-IMP: SIGNIFICANT CHANGE UP
G6PD RBC-CCNC: SIGNIFICANT CHANGE UP

## 2024-01-29 PROCEDURE — 99349 HOME/RES VST EST MOD MDM 40: CPT

## 2024-01-29 RX ORDER — DEXTROAMPHETAMINE SACCHARATE, AMPHETAMINE ASPARTATE, DEXTROAMPHETAMINE SULFATE, AND AMPHETAMINE SULFATE 3.75; 3.75; 3.75; 3.75 MG/1; MG/1; MG/1; MG/1
15 TABLET ORAL DAILY
Refills: 0 | Status: ACTIVE | COMMUNITY

## 2024-01-29 RX ORDER — DEXTROAMPHETAMINE SULFATE, DEXTROAMPHETAMINE SACCHARATE, AMPHETAMINE SULFATE AND AMPHETAMINE ASPARTATE 2.5; 2.5; 2.5; 2.5 MG/1; MG/1; MG/1; MG/1
10 CAPSULE, EXTENDED RELEASE ORAL
Refills: 0 | Status: DISCONTINUED | COMMUNITY
End: 2024-01-29

## 2024-01-29 RX ORDER — DEXTROAMPHETAMINE SACCHARATE, AMPHETAMINE ASPARTATE, DEXTROAMPHETAMINE SULFATE, AND AMPHETAMINE SULFATE 2.5; 2.5; 2.5; 2.5 MG/1; MG/1; MG/1; MG/1
10 TABLET ORAL
Refills: 0 | Status: DISCONTINUED | COMMUNITY
End: 2024-01-29

## 2024-01-29 RX ORDER — BUPROPION HYDROCHLORIDE 300 MG/1
300 TABLET, EXTENDED RELEASE ORAL DAILY
Refills: 0 | Status: ACTIVE | COMMUNITY

## 2024-01-29 NOTE — PLAN
[FreeTextEntry1] : -f/u with Dr. Riojas on 1/31/24  -Patient/family was informed about NP's role/ EPH program and overview of transitional care reviewed with patient. Patient/family educated on topics of importance such as compliance with all provider visits, prescribed medication regimen, and low salt / heart-healthy diet. Patient/Family encouraged to call NP with any issues, concerns or questions, also educated to notify NP if experiencing CP, SOB, cough, increased mucus/phlegm production, abdominal discomfort/swelling, difficulty sleeping or lying flat, fever, chills, fatigue, weight gain of 2-3lbs in 24 hours or 5lbs in one week, dizziness, lightheadedness, n/v/d/c, swelling to extremities and/or any c/o or concerns. Reassurance provided.

## 2024-01-29 NOTE — REVIEW OF SYSTEMS
[Night Sweats] : night sweats [Recent Change In Weight] : ~T recent weight change [Vision Problems] : vision problems [Anxiety] : anxiety [Negative] : Neurological [FreeTextEntry4] : dry nose

## 2024-01-29 NOTE — HISTORY OF PRESENT ILLNESS
[Home] : at home, [unfilled] , at the time of the visit. [Other Location: e.g. Home (Enter Location, City,State)___] : at [unfilled] [Verbal consent obtained from patient] : the patient, [unfilled] [FreeTextEntry1] :  Post-procedural pain [de-identified] : Mr. Behrens is a 27 y/o man with a history of depression and anxiety who presented to ophthalmology with complaints of vision changes over the past month. Found to have bilateral retinal hemorrhages and sent to [facility] ER. Labs revealed Hyperleukocytosis .2 and 42% blasts, transferred to [facility] MICU for emergent leukapheresis due to suspected CML with blast crisis. s/p bone marrow biopsy. Started on Dasatinib on 1/25/24. Discharged home without home care services and close follow-up with heme/onc.   Mr. Behrens was seen today via telecommunication video. Currently, he has complaints of persistent pain at the biopsy site, exacerbated during long travel time home from the hospital. He can only lay prone. Additionally, he reports increased anxiety despite adherence to current psychiatric medications.

## 2024-01-29 NOTE — PHYSICAL EXAM
[No Acute Distress] : no acute distress [Normal Voice/Communication] : normal voice/communication [No Respiratory Distress] : no respiratory distress  [No Accessory Muscle Use] : no accessory muscle use [Alert and Oriented x3] : oriented to person, place, and time [Anxious] : anxious

## 2024-01-30 ENCOUNTER — OUTPATIENT (OUTPATIENT)
Dept: OUTPATIENT SERVICES | Facility: HOSPITAL | Age: 29
LOS: 1 days | Discharge: HOME | End: 2024-01-30

## 2024-01-30 ENCOUNTER — NON-APPOINTMENT (OUTPATIENT)
Age: 29
End: 2024-01-30

## 2024-01-31 ENCOUNTER — RESULT REVIEW (OUTPATIENT)
Age: 29
End: 2024-01-31

## 2024-01-31 ENCOUNTER — TRANSCRIPTION ENCOUNTER (OUTPATIENT)
Age: 29
End: 2024-01-31

## 2024-01-31 ENCOUNTER — NON-APPOINTMENT (OUTPATIENT)
Age: 29
End: 2024-01-31

## 2024-01-31 ENCOUNTER — OUTPATIENT (OUTPATIENT)
Dept: OUTPATIENT SERVICES | Facility: HOSPITAL | Age: 29
LOS: 1 days | End: 2024-01-31
Payer: COMMERCIAL

## 2024-01-31 ENCOUNTER — APPOINTMENT (OUTPATIENT)
Dept: HEMATOLOGY ONCOLOGY | Facility: CLINIC | Age: 29
End: 2024-01-31
Payer: COMMERCIAL

## 2024-01-31 VITALS
TEMPERATURE: 97.8 F | HEIGHT: 66.81 IN | DIASTOLIC BLOOD PRESSURE: 73 MMHG | OXYGEN SATURATION: 99 % | SYSTOLIC BLOOD PRESSURE: 110 MMHG | WEIGHT: 177.91 LBS | RESPIRATION RATE: 16 BRPM | HEART RATE: 86 BPM | BODY MASS INDEX: 27.92 KG/M2

## 2024-01-31 DIAGNOSIS — C92.10 CHRONIC MYELOID LEUKEMIA, BCR/ABL-POSITIVE, NOT HAVING ACHIEVED REMISSION: ICD-10-CM

## 2024-01-31 LAB
ANISOCYTOSIS BLD QL: SLIGHT — SIGNIFICANT CHANGE UP
BASO STIPL BLD QL SMEAR: PRESENT — SIGNIFICANT CHANGE UP
BASOPHILS # BLD AUTO: 0.99 K/UL — HIGH (ref 0–0.2)
BASOPHILS NFR BLD AUTO: 14 % — HIGH (ref 0–2)
DACRYOCYTES BLD QL SMEAR: SLIGHT — SIGNIFICANT CHANGE UP
EOSINOPHIL # BLD AUTO: 0.35 K/UL — SIGNIFICANT CHANGE UP (ref 0–0.5)
EOSINOPHIL NFR BLD AUTO: 5 % — SIGNIFICANT CHANGE UP (ref 0–6)
HCT VFR BLD CALC: 23.8 % — LOW (ref 39–50)
HGB BLD-MCNC: 7.6 G/DL — LOW (ref 13–17)
HYPOCHROMIA BLD QL: SLIGHT — SIGNIFICANT CHANGE UP
LYMPHOCYTES # BLD AUTO: 0.64 K/UL — LOW (ref 1–3.3)
LYMPHOCYTES # BLD AUTO: 9 % — LOW (ref 13–44)
MACROCYTES BLD QL: SLIGHT — SIGNIFICANT CHANGE UP
MCHC RBC-ENTMCNC: 27.6 PG — SIGNIFICANT CHANGE UP (ref 27–34)
MCHC RBC-ENTMCNC: 31.9 G/DL — LOW (ref 32–36)
MCV RBC AUTO: 86.5 FL — SIGNIFICANT CHANGE UP (ref 80–100)
METAMYELOCYTES # FLD: 1 % — HIGH (ref 0–0)
MONOCYTES # BLD AUTO: 0 K/UL — SIGNIFICANT CHANGE UP (ref 0–0.9)
MONOCYTES NFR BLD AUTO: 0 % — LOW (ref 2–14)
MYELOCYTES NFR BLD: 3 % — HIGH (ref 0–0)
NEUTROPHILS # BLD AUTO: 4.81 K/UL — SIGNIFICANT CHANGE UP (ref 1.8–7.4)
NEUTROPHILS NFR BLD AUTO: 67 % — SIGNIFICANT CHANGE UP (ref 43–77)
NEUTS BAND # BLD: 1 % — SIGNIFICANT CHANGE UP (ref 0–8)
NRBC # BLD: 9 /100 WBCS — HIGH (ref 0–0)
NRBC # BLD: SIGNIFICANT CHANGE UP /100 WBCS (ref 0–0)
PLAT MORPH BLD: NORMAL — SIGNIFICANT CHANGE UP
PLATELET # BLD AUTO: 142 K/UL — LOW (ref 150–400)
POIKILOCYTOSIS BLD QL AUTO: SLIGHT — SIGNIFICANT CHANGE UP
POLYCHROMASIA BLD QL SMEAR: SLIGHT — SIGNIFICANT CHANGE UP
RBC # BLD: 2.75 M/UL — LOW (ref 4.2–5.8)
RBC # FLD: 18.3 % — HIGH (ref 10.3–14.5)
RBC BLD AUTO: ABNORMAL
RETICS #: 69.9 K/UL — SIGNIFICANT CHANGE UP (ref 25–125)
RETICS/RBC NFR: 2.5 % — SIGNIFICANT CHANGE UP (ref 0.5–2.5)
SCHISTOCYTES BLD QL AUTO: SLIGHT — SIGNIFICANT CHANGE UP
WBC # BLD: 7.07 K/UL — SIGNIFICANT CHANGE UP (ref 3.8–10.5)
WBC # FLD AUTO: 7.07 K/UL — SIGNIFICANT CHANGE UP (ref 3.8–10.5)

## 2024-01-31 PROCEDURE — 99215 OFFICE O/P EST HI 40 MIN: CPT

## 2024-01-31 PROCEDURE — G2212 PROLONG OUTPT/OFFICE VIS: CPT

## 2024-02-01 ENCOUNTER — TRANSCRIPTION ENCOUNTER (OUTPATIENT)
Age: 29
End: 2024-02-01

## 2024-02-02 LAB
ALBUMIN SERPL ELPH-MCNC: 4 G/DL
ALP BLD-CCNC: 69 U/L
ALT SERPL-CCNC: 27 U/L
ANION GAP SERPL CALC-SCNC: 10 MMOL/L
AST SERPL-CCNC: 21 U/L
BILIRUB SERPL-MCNC: 0.6 MG/DL
BUN SERPL-MCNC: 11 MG/DL
CALCIUM SERPL-MCNC: 8.4 MG/DL
CHLORIDE SERPL-SCNC: 103 MMOL/L
CO2 SERPL-SCNC: 22 MMOL/L
CREAT SERPL-MCNC: 0.73 MG/DL
EGFR: 126 ML/MIN/1.73M2
FERRITIN SERPL-MCNC: 491 NG/ML
FOLATE SERPL-MCNC: 6.7 NG/ML
GLUCOSE SERPL-MCNC: 93 MG/DL
IRON SATN MFR SERPL: 38 %
IRON SERPL-MCNC: 86 UG/DL
LDH SERPL-CCNC: 523 U/L
POTASSIUM SERPL-SCNC: 4.2 MMOL/L
PROT SERPL-MCNC: 6.6 G/DL
SODIUM SERPL-SCNC: 135 MMOL/L
TIBC SERPL-MCNC: 226 UG/DL
UIBC SERPL-MCNC: 140 UG/DL
URATE SERPL-MCNC: 4.2 MG/DL
VIT B12 SERPL-MCNC: >2000 PG/ML

## 2024-02-02 RX ORDER — DASATINIB 100 MG/1
100 TABLET ORAL
Qty: 30 | Refills: 0 | Status: ACTIVE | COMMUNITY

## 2024-02-02 NOTE — PHYSICAL EXAM
[Restricted in physically strenuous activity but ambulatory and able to carry out work of a light or sedentary nature] : Status 1- Restricted in physically strenuous activity but ambulatory and able to carry out work of a light or sedentary nature, e.g., light house work, office work [Normal] : full range of motion and no deformities appreciated [de-identified] : reports blurry vision, however no gross defects [de-identified] : Splenomegaly  ~4 cm below costal margin [de-identified] : Left axillary rash

## 2024-02-02 NOTE — REASON FOR VISIT
[Initial Consultation] : an initial consultation for [Chronic Leukemia] : chronic leukemia [FreeTextEntry2] : CML

## 2024-02-02 NOTE — ASSESSMENT
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 At diagnosis his WBC was 344 with BCR-ABL1 p210 > 10%. HE was started on hydrea, underwent 1x round of leukapheresis and briefly given dasatinib with medication approval pending as cost was too high. Likely will be approved on 2/2/24.  Plan: - CBC with Hb 7.6, plts and WBC now normal range - Hydrea 500 mg BID until he starts dasatinib 100 mg daily, at which point will d/c hydroxyurea. - Will require weekly cbc, can be done [facility] to [facility]. - Discussed risks and things to avoid including intensive exercise and raw foods. - HOLD antimicrobial ppx for now - Rash: Discontinue allopurinol - BCR-ABL1 checks every 3 months - Monthly visit for now, which can be telehealth after initial 1 month follow-up in person  ____ I personally have spent a total of 60 minutes of time on the date of this encounter reviewing test results, documenting findings, providing education, coordinating care and directly consulting with the patient and/or designated family member. [Curative] : Goals of care discussed with patient: Curative

## 2024-02-02 NOTE — HISTORY OF PRESENT ILLNESS
[Disease:__________________________] : Disease: [unfilled] [de-identified] : Initial Presentation: 29 y/o M with no significant PMHx who initially resented to an ophthalmology visit (Dr. Aurea Arreola) for vision changes x 1 month, found to have diffuse retinal hemorrhages and sent directly to [facility] ED. Patient described both distant and near vision changes x 1 month. Also endorsed intermittent night sweats, body aches, hip pain requiring crutches for the past few months. He also had a few episodes of priapism.  In the ED, T 98.1, , /75, RR 17, 99% on RA. Labs significant for .48, Hgb 8.3, uric acid 9.9. Diff significant for 41% blasts, 9% bands, 12% metamyelocytes, 2% myelocytes and 1% promyelocyte. CTH negative for mass or bleed. MICU consulted for central line placement for emergent leukapheresis and subsequently underwent 1 round of leukapheresis on 1/24/24 with peak WBC at 344k prior to leukapheresis. Minimal blasts present (<1%), confirmed by flow cytometry on peripheral blood. Other cell breakdown on 1/24/24 flow cytometry of peripheral blood showed 66% neutrophils, 1% lymphocytes, 2% monocytes, 4% eosinophils,3% basophils, 24% immature granulocytes, 1% blasts. He was started on hydroxyurea 2g TID. US abd with splenomegaly (23.5 cm) and no focal splenic lesions. 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 Dasatinib was started on 1/25/24 and was discharged off dasatinib on 1/30/24 awaiting medication approval with hydroxyurea 2g BID. .   ======================================================== Pathology:  Bone Marrow Biopsy and Aspirate 1/24/24: - Chronic myeloid leukemia, BCR::ABL1 positive - Sections of bone marrow biopsy show marked hypercellularity (100% cellularity), focal fibrosis and crush, marked myeloid hyperplasia with maturation, rare pronormoblasts without maturing erythroid, slightly increased megakaryocytes with small morphology, and iron stores not seen. 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 p190: Positive %BCR-ABL/ABL= 0.005 %  FLT3-ITD mutation: NEGATIVE FLT3-TKD mutation: NEGATIVE  ======================================================== Care Providers:   ======================================================== Contacts:  Self: [phone] Douglas Behrens (Dad- emergency contact) - [phone]  ========================================================  [de-identified] : low-risk chronic phase [Treatment Protocol] : Treatment Protocol [FreeTextEntry1] : Taper hydroxurea and d/c once dasatinib 100 mg daily started. [de-identified] : 1/31/24: Initial visit. See H&P. Remains off dasatinib but on hydrea, expected to get dasatinib medication 2/2/24. Left axillary rash, possible allopurinol drug reaction.  A comprehensive review of systems was performed including constitutional, eyes, ENT, cardiovascular, respiratory, gastrointestinal, genitourinary, musculoskeletal, integumentary, neurological, psychiatric and hematologic / lymphatic. All pertinent positives are included in the H&P under interval history above and the remaining review of systems listed are negative.

## 2024-02-06 ENCOUNTER — NON-APPOINTMENT (OUTPATIENT)
Age: 29
End: 2024-02-06

## 2024-02-06 ENCOUNTER — TRANSCRIPTION ENCOUNTER (OUTPATIENT)
Age: 29
End: 2024-02-06

## 2024-02-06 ENCOUNTER — APPOINTMENT (OUTPATIENT)
Dept: HEMATOLOGY ONCOLOGY | Facility: CLINIC | Age: 29
End: 2024-02-06

## 2024-02-07 ENCOUNTER — LABORATORY RESULT (OUTPATIENT)
Age: 29
End: 2024-02-07

## 2024-02-08 ENCOUNTER — NON-APPOINTMENT (OUTPATIENT)
Age: 29
End: 2024-02-08

## 2024-02-09 ENCOUNTER — RESULT REVIEW (OUTPATIENT)
Age: 29
End: 2024-02-09

## 2024-02-09 ENCOUNTER — APPOINTMENT (OUTPATIENT)
Dept: HEMATOLOGY ONCOLOGY | Facility: CLINIC | Age: 29
End: 2024-02-09

## 2024-02-09 LAB
ALBUMIN SERPL ELPH-MCNC: 4 G/DL
ALP BLD-CCNC: 97 U/L
ALT SERPL-CCNC: 53 U/L
ANION GAP SERPL CALC-SCNC: 11 MMOL/L
AST SERPL-CCNC: 26 U/L
BASOPHILS # BLD AUTO: 0.15 K/UL — SIGNIFICANT CHANGE UP (ref 0–0.2)
BASOPHILS NFR BLD AUTO: 3.7 % — HIGH (ref 0–2)
BILIRUB SERPL-MCNC: 0.3 MG/DL
BUN SERPL-MCNC: 19 MG/DL
CALCIUM SERPL-MCNC: 8.6 MG/DL
CHLORIDE SERPL-SCNC: 107 MMOL/L
CO2 SERPL-SCNC: 23 MMOL/L
CREAT SERPL-MCNC: 0.94 MG/DL
EGFR: 112 ML/MIN/1.73M2
EOSINOPHIL # BLD AUTO: 0.25 K/UL — SIGNIFICANT CHANGE UP (ref 0–0.5)
EOSINOPHIL NFR BLD AUTO: 6.2 % — HIGH (ref 0–6)
GLUCOSE SERPL-MCNC: 91 MG/DL
HCT VFR BLD CALC: 22.8 % — LOW (ref 39–50)
HGB BLD-MCNC: 7.2 G/DL — LOW (ref 13–17)
IMM GRANULOCYTES NFR BLD AUTO: 1 % — HIGH (ref 0–0.9)
LYMPHOCYTES # BLD AUTO: 1.7 K/UL — SIGNIFICANT CHANGE UP (ref 1–3.3)
LYMPHOCYTES # BLD AUTO: 42.4 % — SIGNIFICANT CHANGE UP (ref 13–44)
MCHC RBC-ENTMCNC: 27.7 PG — SIGNIFICANT CHANGE UP (ref 27–34)
MCHC RBC-ENTMCNC: 31.6 G/DL — LOW (ref 32–36)
MCV RBC AUTO: 87.7 FL — SIGNIFICANT CHANGE UP (ref 80–100)
MONOCYTES # BLD AUTO: 0.18 K/UL — SIGNIFICANT CHANGE UP (ref 0–0.9)
MONOCYTES NFR BLD AUTO: 4.5 % — SIGNIFICANT CHANGE UP (ref 2–14)
NEUTROPHILS # BLD AUTO: 1.69 K/UL — LOW (ref 1.8–7.4)
NEUTROPHILS NFR BLD AUTO: 42.2 % — LOW (ref 43–77)
NRBC # BLD: 0 /100 WBCS — SIGNIFICANT CHANGE UP (ref 0–0)
PLATELET # BLD AUTO: 126 K/UL — LOW (ref 150–400)
POTASSIUM SERPL-SCNC: 4 MMOL/L
PROT SERPL-MCNC: 6.5 G/DL
RBC # BLD: 2.6 M/UL — LOW (ref 4.2–5.8)
RBC # FLD: 20.2 % — HIGH (ref 10.3–14.5)
SODIUM SERPL-SCNC: 141 MMOL/L
WBC # BLD: 4.01 K/UL — SIGNIFICANT CHANGE UP (ref 3.8–10.5)
WBC # FLD AUTO: 4.01 K/UL — SIGNIFICANT CHANGE UP (ref 3.8–10.5)

## 2024-02-09 PROCEDURE — 86850 RBC ANTIBODY SCREEN: CPT

## 2024-02-09 PROCEDURE — 86923 COMPATIBILITY TEST ELECTRIC: CPT

## 2024-02-09 PROCEDURE — 86900 BLOOD TYPING SEROLOGIC ABO: CPT

## 2024-02-09 PROCEDURE — 86901 BLOOD TYPING SEROLOGIC RH(D): CPT

## 2024-02-10 ENCOUNTER — TRANSCRIPTION ENCOUNTER (OUTPATIENT)
Age: 29
End: 2024-02-10

## 2024-02-10 ENCOUNTER — APPOINTMENT (OUTPATIENT)
Dept: INFUSION THERAPY | Facility: HOSPITAL | Age: 29
End: 2024-02-10

## 2024-02-12 DIAGNOSIS — C92.10 CHRONIC MYELOID LEUKEMIA, BCR/ABL-POSITIVE, NOT HAVING ACHIEVED REMISSION: ICD-10-CM

## 2024-02-12 DIAGNOSIS — Z51.89 ENCOUNTER FOR OTHER SPECIFIED AFTERCARE: ICD-10-CM

## 2024-02-15 ENCOUNTER — LABORATORY RESULT (OUTPATIENT)
Age: 29
End: 2024-02-15

## 2024-02-20 ENCOUNTER — APPOINTMENT (OUTPATIENT)
Dept: OPHTHALMOLOGY | Facility: CLINIC | Age: 29
End: 2024-02-20
Payer: COMMERCIAL

## 2024-02-20 ENCOUNTER — NON-APPOINTMENT (OUTPATIENT)
Age: 29
End: 2024-02-20

## 2024-02-20 PROCEDURE — 99214 OFFICE O/P EST MOD 30 MIN: CPT

## 2024-02-20 PROCEDURE — 92134 CPTRZ OPH DX IMG PST SGM RTA: CPT

## 2024-02-22 ENCOUNTER — LABORATORY RESULT (OUTPATIENT)
Age: 29
End: 2024-02-22

## 2024-02-26 LAB
ALBUMIN SERPL ELPH-MCNC: 4 G/DL
ALBUMIN SERPL ELPH-MCNC: 4.3 G/DL
ALP BLD-CCNC: 167 U/L
ALP BLD-CCNC: 183 U/L
ALT SERPL-CCNC: 22 U/L
ALT SERPL-CCNC: 31 U/L
ANION GAP SERPL CALC-SCNC: 11 MMOL/L
ANION GAP SERPL CALC-SCNC: 13 MMOL/L
AST SERPL-CCNC: 14 U/L
AST SERPL-CCNC: 17 U/L
BILIRUB SERPL-MCNC: 0.2 MG/DL
BILIRUB SERPL-MCNC: 0.3 MG/DL
BUN SERPL-MCNC: 13 MG/DL
BUN SERPL-MCNC: 14 MG/DL
CALCIUM SERPL-MCNC: 8.5 MG/DL
CALCIUM SERPL-MCNC: 8.7 MG/DL
CHLORIDE SERPL-SCNC: 104 MMOL/L
CHLORIDE SERPL-SCNC: 104 MMOL/L
CO2 SERPL-SCNC: 22 MMOL/L
CO2 SERPL-SCNC: 23 MMOL/L
CREAT SERPL-MCNC: 0.79 MG/DL
CREAT SERPL-MCNC: 0.87 MG/DL
EGFR: 119 ML/MIN/1.73M2
EGFR: 123 ML/MIN/1.73M2
GLUCOSE SERPL-MCNC: 95 MG/DL
GLUCOSE SERPL-MCNC: 97 MG/DL
POTASSIUM SERPL-SCNC: 4.5 MMOL/L
POTASSIUM SERPL-SCNC: 4.7 MMOL/L
PROT SERPL-MCNC: 6.7 G/DL
PROT SERPL-MCNC: 7 G/DL
SODIUM SERPL-SCNC: 139 MMOL/L
SODIUM SERPL-SCNC: 139 MMOL/L

## 2024-02-28 ENCOUNTER — RX RENEWAL (OUTPATIENT)
Age: 29
End: 2024-02-28

## 2024-03-04 ENCOUNTER — APPOINTMENT (OUTPATIENT)
Dept: OPHTHALMOLOGY | Facility: CLINIC | Age: 29
End: 2024-03-04

## 2024-03-07 ENCOUNTER — APPOINTMENT (OUTPATIENT)
Dept: HEMATOLOGY ONCOLOGY | Facility: CLINIC | Age: 29
End: 2024-03-07
Payer: COMMERCIAL

## 2024-03-07 LAB
ALBUMIN SERPL ELPH-MCNC: 4.6 G/DL
ALP BLD-CCNC: 118 U/L
ALT SERPL-CCNC: 17 U/L
ANION GAP SERPL CALC-SCNC: 15 MMOL/L
AST SERPL-CCNC: 14 U/L
BILIRUB SERPL-MCNC: 0.5 MG/DL
BUN SERPL-MCNC: 9 MG/DL
CALCIUM SERPL-MCNC: 9.3 MG/DL
CHLORIDE SERPL-SCNC: 105 MMOL/L
CO2 SERPL-SCNC: 22 MMOL/L
CREAT SERPL-MCNC: 0.92 MG/DL
EGFR: 115 ML/MIN/1.73M2
GLUCOSE SERPL-MCNC: 143 MG/DL
HCT VFR BLD CALC: 35.8 %
HGB BLD-MCNC: 11.4 G/DL
LDH SERPL-CCNC: 167 U/L
MAGNESIUM SERPL-MCNC: 2.2 MG/DL
MCHC RBC-ENTMCNC: 29.1 PG
MCHC RBC-ENTMCNC: 31.8 GM/DL
MCV RBC AUTO: 91.3 FL
PLATELET # BLD AUTO: 351 K/UL
POTASSIUM SERPL-SCNC: 3.9 MMOL/L
PROT SERPL-MCNC: 7 G/DL
RBC # BLD: 3.92 M/UL
RBC # FLD: 22 %
SODIUM SERPL-SCNC: 141 MMOL/L
WBC # FLD AUTO: 6.87 K/UL

## 2024-03-07 PROCEDURE — 99214 OFFICE O/P EST MOD 30 MIN: CPT

## 2024-03-07 PROCEDURE — G2211 COMPLEX E/M VISIT ADD ON: CPT

## 2024-03-07 NOTE — HISTORY OF PRESENT ILLNESS
[Disease:__________________________] : Disease: [unfilled] [Treatment Protocol] : Treatment Protocol [de-identified] : low-risk chronic phase [de-identified] : Initial Presentation: 29 y/o M with no significant PMHx who initially resented to an ophthalmology visit (Dr. Aurea Arreola) for vision changes x 1 month, found to have diffuse retinal hemorrhages and sent directly to [facility] ED. Patient described both distant and near vision changes x 1 month. Also endorsed intermittent night sweats, body aches, hip pain requiring crutches for the past few months. He also had a few episodes of priapism.  In the ED, T 98.1, , /75, RR 17, 99% on RA. Labs significant for .48, Hgb 8.3, uric acid 9.9. Diff significant for 41% blasts, 9% bands, 12% metamyelocytes, 2% myelocytes and 1% promyelocyte. CTH negative for mass or bleed. MICU consulted for central line placement for emergent leukapheresis and subsequently underwent 1 round of leukapheresis on 1/24/24 with peak WBC at 344k prior to leukapheresis. Minimal blasts present (<1%), confirmed by flow cytometry on peripheral blood. Other cell breakdown on 1/24/24 flow cytometry of peripheral blood showed 66% neutrophils, 1% lymphocytes, 2% monocytes, 4% eosinophils,3% basophils, 24% immature granulocytes, 1% blasts. He was started on hydroxyurea 2g TID. US abd with splenomegaly (23.5 cm) and no focal splenic lesions. 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 Dasatinib was started on 1/25/24 and was discharged off dasatinib on 1/30/24 awaiting medication approval with hydroxyurea 2g BID. .   ======================================================== Pathology:  Bone Marrow Biopsy and Aspirate 1/24/24: - Chronic myeloid leukemia, BCR::ABL1 positive - Sections of bone marrow biopsy show marked hypercellularity (100% cellularity), focal fibrosis and crush, marked myeloid hyperplasia with maturation, rare pronormoblasts without maturing erythroid, slightly increased megakaryocytes with small morphology, and iron stores not seen. - Reticulin stain shows diffuse slight increase and focal marked increase and trichrome stain shows focal collagen (grade 1 with focal grade 3 fibrosis)  Aspirate Differential: Blast  0% Neutrophil and Precursors   88% Eosinophil  8% Basophil  3% Pronormoblast  0% Normoblast  1% Monocyte  0% Lymphocyte  0% Plasma cell  0%  Abnormal karyotype: Karyotype: 46,XY,t(9;22)(q34;q11.2)  {20    } FISH Result: ABNORMAL FISH - atypical pattern detected; BCR::ABL1 fusion, ASS1 deletion (97.5%)  Molecular: Quantitative BCR-ABL by real time RT-PCR: p210: Positive BCR-ABL1: >10.000 %  on the International Scale. p190: Positive %BCR-ABL/ABL= 0.005 %  FLT3-ITD mutation: NEGATIVE FLT3-TKD mutation: NEGATIVE  ======================================================== Care Providers:   ======================================================== Contacts:  Self: [phone] Douglas Behrens (Dad- emergency contact) - [phone]  ========================================================  [de-identified] : 1/31/24: Initial visit. See H&P. Remains off dasatinib but on hydrea, expected to get dasatinib medication 2/2/24. Left axillary rash, possible allopurinol drug reaction.  3/7/24: Follow-up. Telehealth. Oziel feels overall better each day. He continues to have fatigue and has not yet felt up to getting back to work. Functionally he feels he has recovered significantly from his time in the hospital. He had blood work yesterday which showed normalizing CBC with resolving anemia hgb 11.4. His LDH has also normalized. We will repeat blood work every 4 weeks for now.  No recent viral illnesses or infections. He is taking Dasatinib 100 mg daily without noticeable side effects other than fatigue.  A comprehensive review of systems was performed including constitutional, eyes, ENT, cardiovascular, respiratory, gastrointestinal, genitourinary, musculoskeletal, integumentary, neurological, psychiatric and hematologic / lymphatic. All pertinent positives are included in the H&P under interval history above and the remaining review of systems listed are negative.    [FreeTextEntry1] : Dasatinib 100 mg daily started.

## 2024-03-07 NOTE — REASON FOR VISIT
[Follow-Up Visit] : a follow-up visit for [Chronic Leukemia] : chronic leukemia [Home] : at home, [unfilled] , at the time of the visit. [Medical Office: ([facility])___] : at the medical office located in  [Patient] : the patient [Self] : self [FreeTextEntry2] : CML

## 2024-03-07 NOTE — ASSESSMENT
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 At diagnosis his WBC was 344 with BCR-ABL1 p210 > 10%. HE was started on hydroxyurea, underwent 1x round of leukapheresis and is now on dasatinib 100 mg once a day therapy and tolerating well. He is currently 1 month into treatment.  Plan: - CBC with normalizing hgb, no longer expected to need transfusion support. Next blood work ordered at [facility] for 4/3/24 - Continue Dasatinib 100 mg daily (started 2/2/24) - BCR-ABL1 checks every 3 months, first check on 5/1/24 (ordered already at [facility]) - HCM: Reports a scabbed over mole, if any changes advised dermatology referral - Next visit via telehealth in May 2024  ____ I personally have spent a total of 25 minutes of time on the date of this encounter reviewing test results, documenting findings, providing education, coordinating further care and directly consulting with the patient and/or designated family member.

## 2024-03-17 NOTE — PROCEDURE NOTE - NSPERFORMEDBY_GEN_A_CORE
[facility] - Observation [address] [place] [address] Medicine  Discharge Summary      Patient Name: Tee Aranda  MRN: [id number]  JESSICA: [id number]  Patient Class: OP- Observation  Admission Date: 3/14/2024  Hospital Length of Stay: 0 days  Discharge Date and Time: 3/16/2024  4:11 PM  Attending Physician: Kassidy att. 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 She states that she has been experiencing these symptoms for the last 4-5d. Initially presented to the ED on 3/12 where CT findings showed a 10 x 6mm right UPJ stone with hydronephrosis. Followed-up with urology yesterday for the stone. During this visit, she states she was not happy with the care and decided that she would present to [facility] ED for further evaluation. Patient states she has a history of kidney stones in the past, but has not had one in the past 3-4 years. She has been taking norco 5-325 at home for pain relief. Currently rating pain 5/10. Denies fever, chills, chest pain, palpitations, SOB, cough, headaches, or any other symptoms at this time.     In ED: AFVSS. CBC unremarkable. CMP unremarkable. UA infectious appearing with many RBCs. Urology assessed patient in ED with plans for cystoscopy with ureteral stent placement today. S/p rocephin, 500cc IVF bolus, and dilaudid in ED. Admitted to  for further observation.       Procedure(s) (LRB):  CYSTOSCOPY, WITH URETERAL STENT REMOVAL (Right)      Hospital Course:   Tee Aranda is a 53 y.o.F who was placed in observation for further evaluation of urolithiasis. She is s/p cystoscopy with R ureteral JJ stent placed with urology. Patient with improved pain following 
procedure, but with gross hematuria and platelets of 50. Platelets transfused. Urology with discharge recommendations of daily flomax, ditropan, pyridium and OP abx x2 weeks.  Patient medically ready for discharge. Plan to follow up with PCP, urology. Return precautions provided. Patient was seen and assessed on day of discharge. Plan of care discussed with patient, patient agreeable with plan, and all questions answered.    Physical Exam  Gen: in NAD, appears stated age  Neuro: AAOx3, motor, sensory, and strength grossly intact BL  HEENT: EOMI, PERRLA; no JVD appreciated  CVS: RRR, no m/r/g  Resp: lungs CTAB, no w/r/r; no belabored breathing or accessory muscle use appreciated   Abd: NTND, soft to palpation  Extrem: no UE or LE edema BL       Goals of Care Treatment Preferences:  Code Status: Full Code      Consults:   Consults (From admission, onward)          Status Ordering Provider     Inpatient consult to Hepatology  Once        Provider:  Mehreen Wang MD    Completed MILAGROS FENTON     Inpatient consult to Urology  Once        Provider:  (Not yet assigned)    Completed BRITTANY HERNANDEZ            No new Assessment & Plan notes have been filed under this hospital service since the last note was generated.  Service: Hospital Medicine    Final Active Diagnoses:    Diagnosis Date Noted POA    PRINCIPAL PROBLEM:  Urolithiasis [N20.9] 03/14/2024 Yes    Acute cystitis with hematuria [N30.01] 03/14/2024 Yes    Thrombocytopenia [D69.6] 09/18/2023 Yes    Portal hypertensive gastropathy [K76.6, K31.89] 12/29/2020 Yes    Liver cirrhosis secondary to CARTAGENA [K75.81, K74.60] 08/31/2020 Yes     Chronic      Problems Resolved During this Admission:       Discharged Condition: good    Disposition: Home or Self Care    Follow Up:   Follow-up Information       James Samano MD. Schedule an appointment as soon as possible for a visit in 1 week(s).    Specialty: Family Medicine  Contact information:  [address]  [facility]  [place] [address]  [phone]                           Patient Instructions:      Notify your health care provider if you experience any of the following:  temperature >100.4     Notify your health care provider if you experience any of the following:  severe uncontrolled pain     Notify your health care provider if you experience any of the following:  redness, tenderness, or signs of infection (pain, swelling, redness, odor or green/yellow discharge around incision site)     Activity as tolerated       Significant Diagnostic Studies: N/A    Pending Diagnostic Studies:       None           Medications:  Reconciled Home Medications:      Medication List        START taking these medications      ciprofloxacin HCl 250 MG tablet  Commonly known as: CIPRO  Take 1 tablet (250 mg total) by mouth 2 (two) times a day. for 14 days     oxybutynin 5 MG Tab  Commonly known as: DITROPAN  Take 1 tablet (5 mg total) by mouth 3 (three) times daily.     tamsulosin 0.4 mg Cap  Commonly known as: FLOMAX  Take 1 capsule (0.4 mg total) by mouth once daily.            CHANGE how you take these medications      * phenazopyridine 200 MG tablet  Commonly known as: PYRIDIUM  Take 1 tablet (200 mg total) by mouth 3 (three) times daily. for 10 days  What changed: Another medication with the same name was added. Make sure you understand how and when to take each.     * phenazopyridine 200 MG tablet  Commonly known as: PYRIDIUM  Take 1 tablet (200 mg total) by mouth 3 (three) times daily as needed for Pain.  What changed: You were already taking a medication with the same name, and this prescription was added. Make sure you understand how and when to take each.           * This list has 2 medication(s) that are the same as other medications prescribed for you. Read the directions carefully, and ask your doctor or other care provider to review them with you.                CONTINUE taking these medications      amitriptyline 10 MG 
tablet  Commonly known as: ELAVIL  Take 1 tablet (10 mg total) by mouth every evening.     carvediloL 3.125 MG tablet  Commonly known as: COREG  TAKE 1 TABLET BY MOUTH 2 TIMES DAILY.     esomeprazole 40 MG capsule  Commonly known as: NEXIUM  Take 1 capsule (40 mg total) by mouth 2 (two) times daily before meals.     fish oil-omega-3 fatty acids 300-1,000 mg capsule  Take 1 capsule by mouth once daily.     HYDROcodone-acetaminophen 5-325 mg per tablet  Commonly known as: NORCO  Take 1 tablet by mouth every 6 (six) hours as needed for Pain.     lactulose 20 gram/30 mL Soln  Commonly known as: CHRONULAC  Take 30 mLs (20 g total) by mouth once daily.     multivitamin per tablet  Commonly known as: THERAGRAN  Take 1 tablet by mouth once daily.     pulse oximeter device  Commonly known as: pulse oximeter  by Apply Externally route 2 (two) times a day. Use twice daily at 8 AM and 3 PM and record the value in [email or web address] as directed.     UNABLE TO FIND  4 (four) times daily as needed. Medible 20 mg blue raspberry 1/4 to 1/2 up to 4 times daily as needed.     XIFAXAN 550 mg Tab  Generic drug: rifAXIMin  Take 1 tablet (550 mg total) by mouth 2 (two) times daily.            STOP taking these medications      naproxen 500 MG tablet  Commonly known as: NAPROSYN              Indwelling Lines/Drains at time of discharge:   Lines/Drains/Airways       None                   Time spent on the discharge of patient: 37 minutes         Lisa Kraus PA-C  Department of Hospital Medicine  [facility] - Observation 11[facility]  
Fellow
Myself

## 2024-03-20 ENCOUNTER — LABORATORY RESULT (OUTPATIENT)
Age: 29
End: 2024-03-20

## 2024-03-21 ENCOUNTER — NON-APPOINTMENT (OUTPATIENT)
Age: 29
End: 2024-03-21

## 2024-04-04 ENCOUNTER — NON-APPOINTMENT (OUTPATIENT)
Age: 29
End: 2024-04-04

## 2024-04-16 ENCOUNTER — OUTPATIENT (OUTPATIENT)
Dept: OUTPATIENT SERVICES | Facility: HOSPITAL | Age: 29
LOS: 1 days | Discharge: HOME | End: 2024-04-16

## 2024-04-16 ENCOUNTER — APPOINTMENT (OUTPATIENT)
Dept: OPHTHALMOLOGY | Facility: CLINIC | Age: 29
End: 2024-04-16

## 2024-04-16 DIAGNOSIS — C92.10 CHRONIC MYELOID LEUKEMIA, BCR/ABL-POSITIVE, NOT HAVING ACHIEVED REMISSION: ICD-10-CM

## 2024-04-17 ENCOUNTER — LABORATORY RESULT (OUTPATIENT)
Age: 29
End: 2024-04-17

## 2024-04-17 LAB
ALBUMIN SERPL ELPH-MCNC: 4.5 G/DL
ALBUMIN SERPL ELPH-MCNC: 4.6 G/DL
ALP BLD-CCNC: 84 U/L
ALP BLD-CCNC: 98 U/L
ALT SERPL-CCNC: 21 U/L
ALT SERPL-CCNC: 21 U/L
ANION GAP SERPL CALC-SCNC: 12 MMOL/L
ANION GAP SERPL CALC-SCNC: 14 MMOL/L
AST SERPL-CCNC: 17 U/L
AST SERPL-CCNC: 20 U/L
BILIRUB SERPL-MCNC: 0.3 MG/DL
BILIRUB SERPL-MCNC: 0.7 MG/DL
BUN SERPL-MCNC: 13 MG/DL
BUN SERPL-MCNC: 14 MG/DL
CALCIUM SERPL-MCNC: 9.1 MG/DL
CALCIUM SERPL-MCNC: 9.3 MG/DL
CHLORIDE SERPL-SCNC: 102 MMOL/L
CHLORIDE SERPL-SCNC: 105 MMOL/L
CO2 SERPL-SCNC: 25 MMOL/L
CO2 SERPL-SCNC: 26 MMOL/L
CREAT SERPL-MCNC: 0.95 MG/DL
CREAT SERPL-MCNC: 1.21 MG/DL
EGFR: 110 ML/MIN/1.73M2
EGFR: 83 ML/MIN/1.73M2
GLUCOSE SERPL-MCNC: 89 MG/DL
GLUCOSE SERPL-MCNC: 99 MG/DL
HCT VFR BLD CALC: 39.4 %
HGB BLD-MCNC: 12.6 G/DL
LDH SERPL-CCNC: 152 U/L
MAGNESIUM SERPL-MCNC: 1.9 MG/DL
MCHC RBC-ENTMCNC: 28.8 PG
MCHC RBC-ENTMCNC: 32 GM/DL
MCV RBC AUTO: 90 FL
PLATELET # BLD AUTO: 295 K/UL
POTASSIUM SERPL-SCNC: 4 MMOL/L
POTASSIUM SERPL-SCNC: 4.4 MMOL/L
PROT SERPL-MCNC: 6.6 G/DL
PROT SERPL-MCNC: 6.9 G/DL
RBC # BLD: 4.38 M/UL
RBC # FLD: 16.7 %
SODIUM SERPL-SCNC: 142 MMOL/L
SODIUM SERPL-SCNC: 142 MMOL/L
URATE SERPL-MCNC: 5.2 MG/DL
WBC # FLD AUTO: 3.8 K/UL

## 2024-04-18 NOTE — ASSESSMENT
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 At diagnosis his WBC was 344 with BCR-ABL1 p210 > 10%. HE was started on hydroxyurea, underwent 1x round of leukapheresis and is now on dasatinib 100 mg once a day therapy and tolerating well. He is currently 2 months into treatment.  Plan: - CBC with normalizing hgb, no longer expected to need transfusion support. Next blood work ordered at [facility] for 5/14/24 - Continue Dasatinib 100 mg daily (started 2/2/24) - BCR-ABL1 checks every 3 months, first check on 5/14/24 (ordered already at [facility]) - HCM: Reports a scabbed over mole, if any changes advised dermatology referral - Next visit via telehealth in May 2024  Case and management discussed with Dr. Riojas ____ I personally have spent a total of 30 minutes of time on the date of this encounter reviewing test results, documenting findings, providing education, coordinating further care and directly consulting with the patient and/or designated family member. [Curative] : Goals of care discussed with patient: Curative

## 2024-04-18 NOTE — REASON FOR VISIT
[Follow-Up Visit] : a follow-up visit for [Chronic Leukemia] : chronic leukemia [FreeTextEntry2] : CML [Home] : at home, [unfilled] , at the time of the visit. [Medical Office: ([facility])___] : at the medical office located in  [Patient] : the patient [Self] : self

## 2024-04-18 NOTE — HISTORY OF PRESENT ILLNESS
[Disease:__________________________] : Disease: [unfilled] [de-identified] : Initial Presentation: 27 y/o M with no significant PMHx who initially resented to an ophthalmology visit (Dr. Aurea Arreola) for vision changes x 1 month, found to have diffuse retinal hemorrhages and sent directly to [facility] ED. Patient described both distant and near vision changes x 1 month. Also endorsed intermittent night sweats, body aches, hip pain requiring crutches for the past few months. He also had a few episodes of priapism.  In the ED, T 98.1, , /75, RR 17, 99% on RA. Labs significant for .48, Hgb 8.3, uric acid 9.9. Diff significant for 41% blasts, 9% bands, 12% metamyelocytes, 2% myelocytes and 1% promyelocyte. CTH negative for mass or bleed. MICU consulted for central line placement for emergent leukapheresis and subsequently underwent 1 round of leukapheresis on 1/24/24 with peak WBC at 344k prior to leukapheresis. Minimal blasts present (<1%), confirmed by flow cytometry on peripheral blood. Other cell breakdown on 1/24/24 flow cytometry of peripheral blood showed 66% neutrophils, 1% lymphocytes, 2% monocytes, 4% eosinophils,3% basophils, 24% immature granulocytes, 1% blasts. He was started on hydroxyurea 2g TID. US abd with splenomegaly (23.5 cm) and no focal splenic lesions. 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 Dasatinib was started on 1/25/24 and was discharged off dasatinib on 1/30/24 awaiting medication approval with hydroxyurea 2g BID. .   ======================================================== Pathology:  Bone Marrow Biopsy and Aspirate 1/24/24: - Chronic myeloid leukemia, BCR::ABL1 positive - Sections of bone marrow biopsy show marked hypercellularity (100% cellularity), focal fibrosis and crush, marked myeloid hyperplasia with maturation, rare pronormoblasts without maturing erythroid, slightly increased megakaryocytes with small morphology, and iron stores not seen. - Reticulin stain shows diffuse slight increase and focal marked increase and trichrome stain shows focal collagen (grade 1 with focal grade 3 fibrosis)  Aspirate Differential: Blast  0% Neutrophil and Precursors   88% Eosinophil  8% Basophil  3% Pronormoblast  0% Normoblast  1% Monocyte  0% Lymphocyte  0% Plasma cell  0%  Abnormal karyotype: Karyotype: 46,XY,t(9;22)(q34;q11.2)   {20      } FISH Result: ABNORMAL FISH - atypical pattern detected; BCR::ABL1 fusion, ASS1 deletion (97.5%)  Molecular: Quantitative BCR-ABL by real time RT-PCR: p210: Positive BCR-ABL1: >10.000 %  on the International Scale. p190: Positive %BCR-ABL/ABL= 0.005 %  FLT3-ITD mutation: NEGATIVE FLT3-TKD mutation: NEGATIVE  ======================================================== Care Providers:   ======================================================== Contacts:  Self: [phone] Douglas Behrens (Dad- emergency contact) - [phone]  ========================================================  [de-identified] : low-risk chronic phase [Treatment Protocol] : Treatment Protocol [FreeTextEntry1] : Dasatinib 100 mg daily started. [de-identified] : 1/31/24: Initial visit. See H&P. Remains off dasatinib but on hydrea, expected to get dasatinib medication 2/2/24. Left axillary rash, possible allopurinol drug reaction.  3/7/24: Follow-up. Telehealth. Oziel feels overall better each day. He continues to have fatigue and has not yet felt up to getting back to work. Functionally he feels he has recovered significantly from his time in the hospital. He had blood work yesterday which showed normalizing CBC with resolving anemia hgb 11.4. His LDH has also normalized. We will repeat blood work every 4 weeks for now.  No recent viral illnesses or infections. He is taking Dasatinib 100 mg daily without noticeable side effects other than fatigue.  4/18/24: Follow-up. Telehealth. Oziel feels overall better each day. He continues to have fatigue and has not yet felt up to getting back to work. Functionally he feels he has recovered significantly from his time in the hospital. No recent viral illnesses or infections. He is taking Dasatinib 100 mg daily without noticeable side effects other than fatigue. Denies fever, chills, or night sweats. Good appetite, stable weight.  A comprehensive review of systems was performed including constitutional, eyes, ENT, cardiovascular, respiratory, gastrointestinal, genitourinary, musculoskeletal, integumentary, neurological, psychiatric and hematologic / lymphatic. All pertinent positives are included in the H&P under interval history above and the remaining review of systems listed are negative.

## 2024-04-19 ENCOUNTER — APPOINTMENT (OUTPATIENT)
Dept: HEMATOLOGY ONCOLOGY | Facility: CLINIC | Age: 29
End: 2024-04-19
Payer: COMMERCIAL

## 2024-04-19 PROCEDURE — 99214 OFFICE O/P EST MOD 30 MIN: CPT

## 2024-04-24 LAB
ALBUMIN SERPL ELPH-MCNC: 4.6 G/DL
ALP BLD-CCNC: 89 U/L
ALT SERPL-CCNC: 16 U/L
ANION GAP SERPL CALC-SCNC: 17 MMOL/L
AST SERPL-CCNC: 15 U/L
BILIRUB SERPL-MCNC: 0.2 MG/DL
BUN SERPL-MCNC: 18 MG/DL
CALCIUM SERPL-MCNC: 9.1 MG/DL
CHLORIDE SERPL-SCNC: 100 MMOL/L
CO2 SERPL-SCNC: 22 MMOL/L
CREAT SERPL-MCNC: 0.89 MG/DL
EGFR: 118 ML/MIN/1.73M2
GLUCOSE SERPL-MCNC: 97 MG/DL
POTASSIUM SERPL-SCNC: 4.3 MMOL/L
PROT SERPL-MCNC: 7.1 G/DL
SODIUM SERPL-SCNC: 138 MMOL/L

## 2024-05-14 ENCOUNTER — LABORATORY RESULT (OUTPATIENT)
Age: 29
End: 2024-05-14

## 2024-05-16 LAB
ALBUMIN SERPL ELPH-MCNC: 4.2 G/DL
ALP BLD-CCNC: 76 U/L
ALT SERPL-CCNC: 14 U/L
ANION GAP SERPL CALC-SCNC: 11 MMOL/L
AST SERPL-CCNC: 16 U/L
BILIRUB SERPL-MCNC: 0.3 MG/DL
BUN SERPL-MCNC: 17 MG/DL
CALCIUM SERPL-MCNC: 8.8 MG/DL
CHLORIDE SERPL-SCNC: 104 MMOL/L
CO2 SERPL-SCNC: 23 MMOL/L
CREAT SERPL-MCNC: 0.9 MG/DL
EGFR: 117 ML/MIN/1.73M2
GLUCOSE SERPL-MCNC: 133 MG/DL
LDH SERPL-CCNC: 158 U/L
POTASSIUM SERPL-SCNC: 4.4 MMOL/L
PROT SERPL-MCNC: 6.6 G/DL
SODIUM SERPL-SCNC: 139 MMOL/L

## 2024-05-21 RX ORDER — SERTRALINE 25 MG/1
1 TABLET, FILM COATED ORAL
Refills: 0 | DISCHARGE

## 2024-05-21 RX ORDER — BUPROPION HYDROCHLORIDE 150 MG/1
1 TABLET, EXTENDED RELEASE ORAL
Refills: 0 | DISCHARGE

## 2024-05-21 RX ORDER — DEXTROAMPHETAMINE SACCHARATE, AMPHETAMINE ASPARTATE, DEXTROAMPHETAMINE SULFATE AND AMPHETAMINE SULFATE 1.875; 1.875; 1.875; 1.875 MG/1; MG/1; MG/1; MG/1
1 TABLET ORAL
Refills: 0 | DISCHARGE

## 2024-05-22 LAB — T(9;22)(ABL1,BCR)/CONTROL BLD/T: NORMAL

## 2024-05-24 ENCOUNTER — APPOINTMENT (OUTPATIENT)
Dept: HEMATOLOGY ONCOLOGY | Facility: CLINIC | Age: 29
End: 2024-05-24
Payer: COMMERCIAL

## 2024-05-24 DIAGNOSIS — C92.10 CHRONIC MYELOID LEUKEMIA, BCR/ABL-POSITIVE, NOT HAVING ACHIEVED REMISSION: ICD-10-CM

## 2024-05-24 PROCEDURE — G2211 COMPLEX E/M VISIT ADD ON: CPT

## 2024-05-24 PROCEDURE — 99215 OFFICE O/P EST HI 40 MIN: CPT

## 2024-05-24 RX ORDER — FOLIC ACID 1 MG/1
1 TABLET ORAL
Qty: 30 | Refills: 0 | Status: DISCONTINUED | COMMUNITY
Start: 2024-02-02 | End: 2024-05-24

## 2024-05-24 RX ORDER — HYDROXYUREA 500 MG/1
500 CAPSULE ORAL
Refills: 0 | Status: DISCONTINUED | COMMUNITY
End: 2024-05-24

## 2024-05-24 RX ORDER — OXYCODONE 5 MG/1
5 TABLET ORAL 3 TIMES DAILY
Qty: 6 | Refills: 0 | Status: DISCONTINUED | COMMUNITY
Start: 2024-01-29 | End: 2024-05-24

## 2024-05-24 RX ORDER — ALLOPURINOL 300 MG/1
300 TABLET ORAL DAILY
Refills: 0 | Status: DISCONTINUED | COMMUNITY
End: 2024-05-24

## 2024-05-28 NOTE — REASON FOR VISIT
[Follow-Up Visit] : a follow-up visit for [Chronic Leukemia] : chronic leukemia [Home] : at home, [unfilled] , at the time of the visit. [Medical Office: ([facility])___] : at the medical office located in  [Patient] : the patient [Self] : self [FreeTextEntry2] : CML

## 2024-05-28 NOTE — HISTORY OF PRESENT ILLNESS
[Disease:__________________________] : Disease: [unfilled] [Treatment Protocol] : Treatment Protocol [de-identified] : Initial Presentation: 29 y/o M with no significant PMHx who initially resented to an ophthalmology visit (Dr. Aurea Arreola) for vision changes x 1 month, found to have diffuse retinal hemorrhages and sent directly to [facility] ED. Patient described both distant and near vision changes x 1 month. Also endorsed intermittent night sweats, body aches, hip pain requiring crutches for the past few months. He also had a few episodes of priapism.  In the ED, T 98.1, , /75, RR 17, 99% on RA. Labs significant for .48, Hgb 8.3, uric acid 9.9. Diff significant for 41% blasts, 9% bands, 12% metamyelocytes, 2% myelocytes and 1% promyelocyte. CTH negative for mass or bleed. MICU consulted for central line placement for emergent leukapheresis and subsequently underwent 1 round of leukapheresis on 1/24/24 with peak WBC at 344k prior to leukapheresis. Minimal blasts present (<1%), confirmed by flow cytometry on peripheral blood. Other cell breakdown on 1/24/24 flow cytometry of peripheral blood showed 66% neutrophils, 1% lymphocytes, 2% monocytes, 4% eosinophils,3% basophils, 24% immature granulocytes, 1% blasts. He was started on hydroxyurea 2g TID. US abd with splenomegaly (23.5 cm) and no focal splenic lesions. 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 Dasatinib was started on 1/25/24 and was discharged off dasatinib on 1/30/24 awaiting medication approval with hydroxyurea 2g BID. .   ======================================================== Pathology:  Bone Marrow Biopsy and Aspirate 1/24/24: - Chronic myeloid leukemia, BCR::ABL1 positive - Sections of bone marrow biopsy show marked hypercellularity (100% cellularity), focal fibrosis and crush, marked myeloid hyperplasia with maturation, rare pronormoblasts without maturing erythroid, slightly increased megakaryocytes with small morphology, and iron stores not seen. - Reticulin stain shows diffuse slight increase and focal marked increase and trichrome stain shows focal collagen (grade 1 with focal grade 3 fibrosis)  Aspirate Differential: Blast  0% Neutrophil and Precursors   88% Eosinophil  8% Basophil  3% Pronormoblast  0% Normoblast  1% Monocyte  0% Lymphocyte  0% Plasma cell  0%  Abnormal karyotype: Karyotype: 46,XY,t(9;22)(q34;q11.2)    {20        } FISH Result: ABNORMAL FISH - atypical pattern detected; BCR::ABL1 fusion, ASS1 deletion (97.5%)  Molecular: Quantitative BCR-ABL by real time RT-PCR: p210: Positive BCR-ABL1: >10.000 %  on the International Scale. p190: Positive %BCR-ABL/ABL= 0.005 %  FLT3-ITD mutation: NEGATIVE FLT3-TKD mutation: NEGATIVE  ======================================================== Care Providers:   ======================================================== Contacts:  Self: [phone] Douglas Behrens (Dad- emergency contact) - [phone]  ========================================================  [de-identified] : low-risk chronic phase [FreeTextEntry1] : Dasatinib 100 mg daily started. [de-identified] : 1/31/24: Initial visit. See H&P. Remains off dasatinib but on hydrea, expected to get dasatinib medication 2/2/24. Left axillary rash, possible allopurinol drug reaction.  3/7/24: Follow-up. Telehealth. Oziel feels overall better each day. He continues to have fatigue and has not yet felt up to getting back to work. Functionally he feels he has recovered significantly from his time in the hospital. He had blood work yesterday which showed normalizing CBC with resolving anemia hgb 11.4. His LDH has also normalized. We will repeat blood work every 4 weeks for now.  No recent viral illnesses or infections. He is taking Dasatinib 100 mg daily without noticeable side effects other than fatigue.  4/18/24: Follow-up. Telehealth. Oziel feels overall better each day. He continues to have fatigue and has not yet felt up to getting back to work, considering going back part time to see how things go and then proceed form there. No recent viral illnesses or infections. He is taking Dasatinib 100 mg daily without noticeable side effects other than fatigue. Denies fever, chills, or night sweats. Good appetite, stable weight.  5/28/24: Follow-up via telehealth. Patient previously had difficulty coming here due to debilitating disease / therapy related fatigue, however this is now improved and he feels back to baseline. His most recent CBC confirms full blood count recovery, however he still has 2% leukemia present in the blood. Today we discussed habits around taking his dasatinib therapy to maximize absorption. We discussed return to work based on full resolution of his debility, return to a mild to moderate exercise regimen with slow increase in intensity. HE remains at risk of developing treatment related side effects, including edema, pulmonary congestion, skin reactions and various drug related rashes are most common. Some degree of fatigue is expected, however it should no longer negatively impact his ADLs.  A comprehensive review of systems was performed including constitutional, eyes, ENT, cardiovascular, respiratory, gastrointestinal, genitourinary, musculoskeletal, integumentary, neurological, psychiatric and hematologic / lymphatic. All pertinent positives are included in the H&P under interval history above and the remaining review of systems listed are negative.

## 2024-05-28 NOTE — PHYSICAL EXAM
[Restricted in physically strenuous activity but ambulatory and able to carry out work of a light or sedentary nature] : Status 1- Restricted in physically strenuous activity but ambulatory and able to carry out work of a light or sedentary nature, e.g., light house work, office work [Normal] : affect appropriate [Fully active, able to carry on all pre-disease performance without restriction] : Status 0 - Fully active, able to carry on all pre-disease performance without restriction

## 2024-05-28 NOTE — ASSESSMENT
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 At diagnosis his WBC was 344 with BCR-ABL1 p210 > 10%. HE was started on hydroxyurea, underwent 1x round of leukapheresis and is now on dasatinib 100 mg once a day therapy (started 2/2/24) and tolerating well.   He is currently 112 days into treatment with improvements in blood counts and performance status / fatigue. His 3 month BCR-ABL1 was 2.671% suggesting TKI sensitive disease, however will monitor closely, as we would expect this to be lower at this point in very sensitive disease.  Plan: - CBC now normalized. - Continue Dasatinib 100 mg daily (started 2/2/24), has been off hydroxyurea since March 2024 - CML monitoring: BCR-ABL1 checks every 3 months, first check on 5/14/24 with BCR-ABL1 at 2.671%. Discussed habits around taking dasatinib to maximize medication absorption. - Letter and forms completed for return to normal activity / work and disability. - HCM: No current issues. Debilitating fatigue now resolved. - Next visit in 3 months needs to be in person  ____ I personally have spent a total of 45 minutes of time on the date of this encounter reviewing test results, documenting findings, providing education, coordinating further care and directly consulting with the patient and/or designated family member.

## 2024-06-03 PROBLEM — F32.9 MAJOR DEPRESSIVE DISORDER, SINGLE EPISODE, UNSPECIFIED: Chronic | Status: ACTIVE | Noted: 2024-01-24

## 2024-07-18 ENCOUNTER — OUTPATIENT (OUTPATIENT)
Dept: OUTPATIENT SERVICES | Facility: HOSPITAL | Age: 29
LOS: 1 days | Discharge: HOME | End: 2024-07-18

## 2024-07-18 DIAGNOSIS — C92.10 CHRONIC MYELOID LEUKEMIA, BCR/ABL-POSITIVE, NOT HAVING ACHIEVED REMISSION: ICD-10-CM

## 2024-07-22 ENCOUNTER — RESULT REVIEW (OUTPATIENT)
Age: 29
End: 2024-07-22

## 2024-07-22 ENCOUNTER — APPOINTMENT (OUTPATIENT)
Dept: HEMATOLOGY ONCOLOGY | Facility: CLINIC | Age: 29
End: 2024-07-22
Payer: COMMERCIAL

## 2024-07-22 VITALS
RESPIRATION RATE: 16 BRPM | WEIGHT: 217.82 LBS | DIASTOLIC BLOOD PRESSURE: 76 MMHG | OXYGEN SATURATION: 97 % | HEART RATE: 73 BPM | BODY MASS INDEX: 34.31 KG/M2 | SYSTOLIC BLOOD PRESSURE: 120 MMHG | TEMPERATURE: 98.2 F

## 2024-07-22 DIAGNOSIS — C92.10 CHRONIC MYELOID LEUKEMIA, BCR/ABL-POSITIVE, NOT HAVING ACHIEVED REMISSION: ICD-10-CM

## 2024-07-22 LAB
ALBUMIN SERPL ELPH-MCNC: 4.4 G/DL
ALP BLD-CCNC: 57 U/L
ALT SERPL-CCNC: 17 U/L
ANION GAP SERPL CALC-SCNC: 12 MMOL/L
AST SERPL-CCNC: 17 U/L
BASOPHILS # BLD AUTO: 0.05 K/UL — SIGNIFICANT CHANGE UP (ref 0–0.2)
BASOPHILS NFR BLD AUTO: 0.7 % — SIGNIFICANT CHANGE UP (ref 0–2)
BILIRUB SERPL-MCNC: 0.4 MG/DL
BUN SERPL-MCNC: 13 MG/DL
CALCIUM SERPL-MCNC: 9 MG/DL
CHLORIDE SERPL-SCNC: 105 MMOL/L
CO2 SERPL-SCNC: 26 MMOL/L
CREAT SERPL-MCNC: 0.92 MG/DL
EGFR: 114 ML/MIN/1.73M2
EOSINOPHIL # BLD AUTO: 0.38 K/UL — SIGNIFICANT CHANGE UP (ref 0–0.5)
EOSINOPHIL NFR BLD AUTO: 5.7 % — SIGNIFICANT CHANGE UP (ref 0–6)
GLUCOSE SERPL-MCNC: 97 MG/DL
HCT VFR BLD CALC: 42.6 % — SIGNIFICANT CHANGE UP (ref 39–50)
HGB BLD-MCNC: 14.2 G/DL — SIGNIFICANT CHANGE UP (ref 13–17)
IMM GRANULOCYTES NFR BLD AUTO: 0.3 % — SIGNIFICANT CHANGE UP (ref 0–0.9)
LDH SERPL-CCNC: 204 U/L
LYMPHOCYTES # BLD AUTO: 2.06 K/UL — SIGNIFICANT CHANGE UP (ref 1–3.3)
LYMPHOCYTES # BLD AUTO: 30.7 % — SIGNIFICANT CHANGE UP (ref 13–44)
MCHC RBC-ENTMCNC: 30.2 PG — SIGNIFICANT CHANGE UP (ref 27–34)
MCHC RBC-ENTMCNC: 33.3 G/DL — SIGNIFICANT CHANGE UP (ref 32–36)
MCV RBC AUTO: 90.6 FL — SIGNIFICANT CHANGE UP (ref 80–100)
MONOCYTES # BLD AUTO: 0.49 K/UL — SIGNIFICANT CHANGE UP (ref 0–0.9)
MONOCYTES NFR BLD AUTO: 7.3 % — SIGNIFICANT CHANGE UP (ref 2–14)
NEUTROPHILS # BLD AUTO: 3.72 K/UL — SIGNIFICANT CHANGE UP (ref 1.8–7.4)
NEUTROPHILS NFR BLD AUTO: 55.3 % — SIGNIFICANT CHANGE UP (ref 43–77)
NRBC # BLD: 0 /100 WBCS — SIGNIFICANT CHANGE UP (ref 0–0)
PLATELET # BLD AUTO: 166 K/UL — SIGNIFICANT CHANGE UP (ref 150–400)
POTASSIUM SERPL-SCNC: 4.6 MMOL/L
PROT SERPL-MCNC: 6.7 G/DL
RBC # BLD: 4.7 M/UL — SIGNIFICANT CHANGE UP (ref 4.2–5.8)
RBC # FLD: 15.5 % — HIGH (ref 10.3–14.5)
SODIUM SERPL-SCNC: 143 MMOL/L
WBC # BLD: 6.72 K/UL — SIGNIFICANT CHANGE UP (ref 3.8–10.5)
WBC # FLD AUTO: 6.72 K/UL — SIGNIFICANT CHANGE UP (ref 3.8–10.5)

## 2024-07-22 PROCEDURE — G2211 COMPLEX E/M VISIT ADD ON: CPT

## 2024-07-22 PROCEDURE — 99214 OFFICE O/P EST MOD 30 MIN: CPT

## 2024-07-25 NOTE — HISTORY OF PRESENT ILLNESS
[Disease:__________________________] : Disease: [unfilled] [Treatment Protocol] : Treatment Protocol [de-identified] : low-risk chronic phase [de-identified] : Initial Presentation: 29 y/o M with no significant PMHx who initially resented to an ophthalmology visit (Dr. Aurea Arreola) for vision changes x 1 month, found to have diffuse retinal hemorrhages and sent directly to [facility] ED. Patient described both distant and near vision changes x 1 month. Also endorsed intermittent night sweats, body aches, hip pain requiring crutches for the past few months. He also had a few episodes of priapism.  In the ED, T 98.1, , /75, RR 17, 99% on RA. Labs significant for .48, Hgb 8.3, uric acid 9.9. Diff significant for 41% blasts, 9% bands, 12% metamyelocytes, 2% myelocytes and 1% promyelocyte. CTH negative for mass or bleed. MICU consulted for central line placement for emergent leukapheresis and subsequently underwent 1 round of leukapheresis on 1/24/24 with peak WBC at 344k prior to leukapheresis. Minimal blasts present (<1%), confirmed by flow cytometry on peripheral blood. Other cell breakdown on 1/24/24 flow cytometry of peripheral blood showed 66% neutrophils, 1% lymphocytes, 2% monocytes, 4% eosinophils,3% basophils, 24% immature granulocytes, 1% blasts. He was started on hydroxyurea 2g TID. US abd with splenomegaly (23.5 cm) and no focal splenic lesions. 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 Dasatinib was started on 1/25/24 and was discharged off dasatinib on 1/30/24 awaiting medication approval with hydroxyurea 2g BID. .   ======================================================== Pathology:  Bone Marrow Biopsy and Aspirate 1/24/24: - Chronic myeloid leukemia, BCR::ABL1 positive - Sections of bone marrow biopsy show marked hypercellularity (100% cellularity), focal fibrosis and crush, marked myeloid hyperplasia with maturation, rare pronormoblasts without maturing erythroid, slightly increased megakaryocytes with small morphology, and iron stores not seen. 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 p190: Positive %BCR-ABL/ABL= 0.005 %  FLT3-ITD mutation: NEGATIVE FLT3-TKD mutation: NEGATIVE  ======================================================== Care Providers:   ======================================================== Contacts:  Self: [phone] Douglas Behrens (Dad- emergency contact) - [phone]  ========================================================  [de-identified] : 1/31/24: Initial visit. See H&P. Remains off dasatinib but on hydrea, expected to get dasatinib medication 2/2/24. Left axillary rash, possible allopurinol drug reaction.  3/7/24: Follow-up. Telehealth. Oziel feels overall better each day. He continues to have fatigue and has not yet felt up to getting back to work. Functionally he feels he has recovered significantly from his time in the hospital. He had blood work yesterday which showed normalizing CBC with resolving anemia hgb 11.4. His LDH has also normalized. We will repeat blood work every 4 weeks for now.  No recent viral illnesses or infections. He is taking Dasatinib 100 mg daily without noticeable side effects other than fatigue.  4/18/24: Follow-up. Telehealth. Oziel feels overall better each day. He continues to have fatigue and has not yet felt up to getting back to work, considering going back part time to see how things go and then proceed form there. No recent viral illnesses or infections. He is taking Dasatinib 100 mg daily without noticeable side effects other than fatigue. Denies fever, chills, or night sweats. Good appetite, stable weight.  5/28/24: Follow-up via telehealth. Patient previously had difficulty coming here due to debilitating disease / therapy related fatigue, however this is now improved and he feels back to baseline. His most recent CBC confirms full blood count recovery, however he still has 2% leukemia present in the blood. Today we discussed habits around taking his dasatinib therapy to maximize absorption. We discussed return to work based on full resolution of his debility, return to a mild to moderate exercise regimen with slow increase in intensity. He remains at risk of developing treatment related side effects, including edema, pulmonary congestion, skin reactions and various drug related rashes are most common. Some degree of fatigue is expected, however it should no longer negatively impact his ADLs.  7/22/24: Follow-up in person. He has been concerned about increased fatigue, muscle aches and myalgias, mostly concerned with his right shoulder and medial / lateral clavicle. He has not been using antacids. 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 He has yet to get back into routine of exercise. No rashes, mass or lumps. No constitutional issues. He is taking dasatinib consistently as prescribed.  A comprehensive review of systems was performed including constitutional, eyes, ENT, cardiovascular, respiratory, gastrointestinal, genitourinary, musculoskeletal, integumentary, neurological, psychiatric and hematologic / lymphatic. All pertinent positives are included in the H&P under interval history above and the remaining review of systems listed are negative.   [FreeTextEntry1] : Dasatinib 100 mg daily started.

## 2024-07-25 NOTE — ASSESSMENT
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 At diagnosis his WBC was 344 with BCR-ABL1 p210 > 10%. HE was started on hydroxyurea, underwent 1x round of leukapheresis and is now on dasatinib 100 mg once a day therapy (started 2/2/24).  He is currently 171 days into treatment with improvements in blood counts and performance status / fatigue. His 3 month BCR-ABL1 was 2.671% suggesting TKI sensitive disease, however will monitor closely, as we would expect this to be lower at this point in very sensitive disease.   He is presently having tolerability issues. He has been experiencing myalgias and fatigue on the therapy. No abnormalities in blood counts. He will continue current dose for now, however if negatively impacts his function at work, we may reduce to 70 mg once a day in future visits.  Plan: - CBC now normalized. - Continue Dasatinib 100 mg daily (started 2/2/24) - CML monitoring: BCR-ABL1 checks every 3 months, first check on 5/14/24 with BCR-ABL1 at 2.671%. Discussed habits around taking dasatinib to maximize medication absorption. Next check on 8/10/24 at [facility]: No current issues. Debilitating fatigue now resolved. - Next visit in 3 months via telehealth  ____ I personally have spent a total of 30 minutes of time on the date of this encounter reviewing test results, documenting findings, providing education, coordinating further care and directly consulting with the patient and/or designated family member.

## 2024-07-25 NOTE — PHYSICAL EXAM
[Fully active, able to carry on all pre-disease performance without restriction] : Status 0 - Fully active, able to carry on all pre-disease performance without restriction [Normal] : normoactive bowel sounds, soft and nontender, no hepatosplenomegaly or masses appreciated [de-identified] : Right claviclar tenderness near ends, no abnormal lumps or masses

## 2024-07-25 NOTE — PHYSICAL EXAM
[Fully active, able to carry on all pre-disease performance without restriction] : Status 0 - Fully active, able to carry on all pre-disease performance without restriction [Normal] : no peripheral adenopathy appreciated [de-identified] : Right claviclar tenderness near ends, no abnormal lumps or masses

## 2024-07-25 NOTE — HISTORY OF PRESENT ILLNESS
[Disease:__________________________] : Disease: [unfilled] [Treatment Protocol] : Treatment Protocol [de-identified] : Initial Presentation: 29 y/o M with no significant PMHx who initially resented to an ophthalmology visit (Dr. Aurea Arreola) for vision changes x 1 month, found to have diffuse retinal hemorrhages and sent directly to [facility] ED. Patient described both distant and near vision changes x 1 month. Also endorsed intermittent night sweats, body aches, hip pain requiring crutches for the past few months. He also had a few episodes of priapism.  In the ED, T 98.1, , /75, RR 17, 99% on RA. Labs significant for .48, Hgb 8.3, uric acid 9.9. Diff significant for 41% blasts, 9% bands, 12% metamyelocytes, 2% myelocytes and 1% promyelocyte. CTH negative for mass or bleed. MICU consulted for central line placement for emergent leukapheresis and subsequently underwent 1 round of leukapheresis on 1/24/24 with peak WBC at 344k prior to leukapheresis. Minimal blasts present (<1%), confirmed by flow cytometry on peripheral blood. Other cell breakdown on 1/24/24 flow cytometry of peripheral blood showed 66% neutrophils, 1% lymphocytes, 2% monocytes, 4% eosinophils,3% basophils, 24% immature granulocytes, 1% blasts. He was started on hydroxyurea 2g TID. US abd with splenomegaly (23.5 cm) and no focal splenic lesions. 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 Dasatinib was started on 1/25/24 and was discharged off dasatinib on 1/30/24 awaiting medication approval with hydroxyurea 2g BID. .   ======================================================== Pathology:  Bone Marrow Biopsy and Aspirate 1/24/24: - Chronic myeloid leukemia, BCR::ABL1 positive - Sections of bone marrow biopsy show marked hypercellularity (100% cellularity), focal fibrosis and crush, marked myeloid hyperplasia with maturation, rare pronormoblasts without maturing erythroid, slightly increased megakaryocytes with small morphology, and iron stores not seen. 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 p190: Positive %BCR-ABL/ABL= 0.005 %  FLT3-ITD mutation: NEGATIVE FLT3-TKD mutation: NEGATIVE  ======================================================== Care Providers:   ======================================================== Contacts:  Self: [phone] Douglas Behrens (Dad- emergency contact) - [phone]  ========================================================  [de-identified] : low-risk chronic phase [de-identified] : 1/31/24: Initial visit. See H&P. Remains off dasatinib but on hydrea, expected to get dasatinib medication 2/2/24. Left axillary rash, possible allopurinol drug reaction.  3/7/24: Follow-up. Telehealth. Oziel feels overall better each day. He continues to have fatigue and has not yet felt up to getting back to work. Functionally he feels he has recovered significantly from his time in the hospital. He had blood work yesterday which showed normalizing CBC with resolving anemia hgb 11.4. His LDH has also normalized. We will repeat blood work every 4 weeks for now.  No recent viral illnesses or infections. He is taking Dasatinib 100 mg daily without noticeable side effects other than fatigue.  4/18/24: Follow-up. Telehealth. Oziel feels overall better each day. He continues to have fatigue and has not yet felt up to getting back to work, considering going back part time to see how things go and then proceed form there. No recent viral illnesses or infections. He is taking Dasatinib 100 mg daily without noticeable side effects other than fatigue. Denies fever, chills, or night sweats. Good appetite, stable weight.  5/28/24: Follow-up via telehealth. Patient previously had difficulty coming here due to debilitating disease / therapy related fatigue, however this is now improved and he feels back to baseline. His most recent CBC confirms full blood count recovery, however he still has 2% leukemia present in the blood. Today we discussed habits around taking his dasatinib therapy to maximize absorption. We discussed return to work based on full resolution of his debility, return to a mild to moderate exercise regimen with slow increase in intensity. He remains at risk of developing treatment related side effects, including edema, pulmonary congestion, skin reactions and various drug related rashes are most common. Some degree of fatigue is expected, however it should no longer negatively impact his ADLs.  7/22/24: Follow-up in person. He has been concerned about increased fatigue, muscle aches and myalgias, mostly concerned with his right shoulder and medial / lateral clavicle. He has not been using antacids. 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 He has yet to get back into routine of exercise. No rashes, mass or lumps. No constitutional issues. He is taking dasatinib consistently as prescribed.  A comprehensive review of systems was performed including constitutional, eyes, ENT, cardiovascular, respiratory, gastrointestinal, genitourinary, musculoskeletal, integumentary, neurological, psychiatric and hematologic / lymphatic. All pertinent positives are included in the H&P under interval history above and the remaining review of systems listed are negative.   [FreeTextEntry1] : Dasatinib 100 mg daily started.

## 2024-07-25 NOTE — ASSESSMENT
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 At diagnosis his WBC was 344 with BCR-ABL1 p210 > 10%. HE was started on hydroxyurea, underwent 1x round of leukapheresis and is now on dasatinib 100 mg once a day therapy (started 2/2/24).  He is currently 171 days into treatment with improvements in blood counts and performance status / fatigue. His 3 month BCR-ABL1 was 2.671% suggesting TKI sensitive disease, however will monitor closely, as we would expect this to be lower at this point in very sensitive disease.   He is presently having tolerability issues. He has been experiencing myalgias and fatigue on the therapy. No abnormalities in blood counts. He will continue current dose for now, however if negatively impacts his function at work, we may reduce to 70 mg once a day in future visits.  Plan: - CBC now normalized. - Continue Dasatinib 100 mg daily (started 2/2/24) - CML monitoring: BCR-ABL1 checks every 3 months, first check on 5/14/24 with BCR-ABL1 at 2.671%. Discussed habits around taking dasatinib to maximize medication absorption. Next check on 8/10/24 at [facility]: No current issues. Debilitating fatigue now resolved. - Next visit in 3 months via telehealth  ____ I personally have spent a total of 30 minutes of time on the date of this encounter reviewing test results, documenting findings, providing education, coordinating further care and directly consulting with the patient and/or designated family member.

## 2024-08-22 DIAGNOSIS — C92.10 CHRONIC MYELOID LEUKEMIA, BCR/ABL-POSITIVE, NOT HAVING ACHIEVED REMISSION: ICD-10-CM

## 2024-08-28 ENCOUNTER — APPOINTMENT (OUTPATIENT)
Dept: HEMATOLOGY ONCOLOGY | Facility: CLINIC | Age: 29
End: 2024-08-28

## 2024-10-08 ENCOUNTER — OUTPATIENT (OUTPATIENT)
Dept: OUTPATIENT SERVICES | Facility: HOSPITAL | Age: 29
LOS: 1 days | Discharge: HOME | End: 2024-10-08

## 2024-10-08 DIAGNOSIS — C92.10 CHRONIC MYELOID LEUKEMIA, BCR/ABL-POSITIVE, NOT HAVING ACHIEVED REMISSION: ICD-10-CM

## 2024-10-21 ENCOUNTER — APPOINTMENT (OUTPATIENT)
Dept: HEMATOLOGY ONCOLOGY | Facility: CLINIC | Age: 29
End: 2024-10-21
Payer: COMMERCIAL

## 2024-10-21 DIAGNOSIS — C92.10 CHRONIC MYELOID LEUKEMIA, BCR/ABL-POSITIVE, NOT HAVING ACHIEVED REMISSION: ICD-10-CM

## 2024-10-21 PROCEDURE — 99213 OFFICE O/P EST LOW 20 MIN: CPT

## 2024-10-21 PROCEDURE — G2211 COMPLEX E/M VISIT ADD ON: CPT

## 2024-12-27 ENCOUNTER — OUTPATIENT (OUTPATIENT)
Dept: OUTPATIENT SERVICES | Facility: HOSPITAL | Age: 29
LOS: 1 days | Discharge: HOME | End: 2024-12-27

## 2024-12-27 DIAGNOSIS — C92.10 CHRONIC MYELOID LEUKEMIA, BCR/ABL-POSITIVE, NOT HAVING ACHIEVED REMISSION: ICD-10-CM

## 2025-01-06 ENCOUNTER — APPOINTMENT (OUTPATIENT)
Dept: HEMATOLOGY ONCOLOGY | Facility: CLINIC | Age: 30
End: 2025-01-06
Payer: COMMERCIAL

## 2025-01-06 DIAGNOSIS — C92.10 CHRONIC MYELOID LEUKEMIA, BCR/ABL-POSITIVE, NOT HAVING ACHIEVED REMISSION: ICD-10-CM

## 2025-01-06 PROCEDURE — G2211 COMPLEX E/M VISIT ADD ON: CPT

## 2025-01-06 PROCEDURE — 99213 OFFICE O/P EST LOW 20 MIN: CPT

## 2025-04-02 ENCOUNTER — APPOINTMENT (OUTPATIENT)
Dept: FAMILY MEDICINE | Facility: CLINIC | Age: 30
End: 2025-04-02
Payer: COMMERCIAL

## 2025-04-02 ENCOUNTER — NON-APPOINTMENT (OUTPATIENT)
Age: 30
End: 2025-04-02

## 2025-04-02 VITALS
OXYGEN SATURATION: 98 % | HEIGHT: 66.81 IN | WEIGHT: 220 LBS | HEART RATE: 94 BPM | SYSTOLIC BLOOD PRESSURE: 132 MMHG | RESPIRATION RATE: 16 BRPM | TEMPERATURE: 98.5 F | BODY MASS INDEX: 34.53 KG/M2 | DIASTOLIC BLOOD PRESSURE: 84 MMHG

## 2025-04-02 DIAGNOSIS — Z83.518 FAMILY HISTORY OF OTHER SPECIFIED EYE DISORDER: ICD-10-CM

## 2025-04-02 DIAGNOSIS — F32.A ANXIETY DISORDER, UNSPECIFIED: ICD-10-CM

## 2025-04-02 DIAGNOSIS — J06.9 ACUTE UPPER RESPIRATORY INFECTION, UNSPECIFIED: ICD-10-CM

## 2025-04-02 DIAGNOSIS — Z80.0 FAMILY HISTORY OF MALIGNANT NEOPLASM OF DIGESTIVE ORGANS: ICD-10-CM

## 2025-04-02 DIAGNOSIS — Z80.1 FAMILY HISTORY OF MALIGNANT NEOPLASM OF TRACHEA, BRONCHUS AND LUNG: ICD-10-CM

## 2025-04-02 DIAGNOSIS — F41.9 ANXIETY DISORDER, UNSPECIFIED: ICD-10-CM

## 2025-04-02 DIAGNOSIS — Z76.89 PERSONS ENCOUNTERING HEALTH SERVICES IN OTHER SPECIFIED CIRCUMSTANCES: ICD-10-CM

## 2025-04-02 PROCEDURE — 87811 SARS-COV-2 COVID19 W/OPTIC: CPT | Mod: QW

## 2025-04-02 PROCEDURE — 87880 STREP A ASSAY W/OPTIC: CPT | Mod: QW

## 2025-04-02 PROCEDURE — 87804 INFLUENZA ASSAY W/OPTIC: CPT | Mod: QW

## 2025-04-02 PROCEDURE — G2211 COMPLEX E/M VISIT ADD ON: CPT

## 2025-04-02 PROCEDURE — 99204 OFFICE O/P NEW MOD 45 MIN: CPT

## 2025-04-02 RX ORDER — FLUTICASONE PROPIONATE 50 UG/1
50 SPRAY, METERED NASAL
Qty: 1 | Refills: 0 | Status: ACTIVE | COMMUNITY
Start: 2025-04-02 | End: 1900-01-01

## 2025-04-02 RX ORDER — BENZONATATE 100 MG/1
100 CAPSULE ORAL
Qty: 15 | Refills: 0 | Status: ACTIVE | COMMUNITY
Start: 2025-04-02 | End: 1900-01-01

## 2025-04-02 RX ORDER — AZELASTINE HYDROCHLORIDE 137 UG/1
137 SPRAY, METERED NASAL TWICE DAILY
Qty: 1 | Refills: 0 | Status: ACTIVE | COMMUNITY
Start: 2025-04-02 | End: 1900-01-01

## 2025-04-02 RX ORDER — ALBUTEROL SULFATE 90 UG/1
108 (90 BASE) INHALANT RESPIRATORY (INHALATION)
Qty: 1 | Refills: 0 | Status: ACTIVE | COMMUNITY
Start: 2025-04-02 | End: 1900-01-01

## 2025-04-05 LAB
ALBUMIN SERPL ELPH-MCNC: 4.1 G/DL
ALP BLD-CCNC: 61 U/L
ALT SERPL-CCNC: 15 U/L
ANION GAP SERPL CALC-SCNC: 11 MMOL/L
AST SERPL-CCNC: 20 U/L
BASOPHILS # BLD AUTO: 0.04 K/UL
BASOPHILS NFR BLD AUTO: 0.9 %
BILIRUB SERPL-MCNC: 0.4 MG/DL
BUN SERPL-MCNC: 14 MG/DL
CALCIUM SERPL-MCNC: 9.5 MG/DL
CHLORIDE SERPL-SCNC: 104 MMOL/L
CO2 SERPL-SCNC: 25 MMOL/L
CREAT SERPL-MCNC: 0.99 MG/DL
EGFRCR SERPLBLD CKD-EPI 2021: 104 ML/MIN/1.73M2
EOSINOPHIL # BLD AUTO: 0.27 K/UL
EOSINOPHIL NFR BLD AUTO: 6.3 %
GLUCOSE SERPL-MCNC: 89 MG/DL
HCT VFR BLD CALC: 40.6 %
HGB BLD-MCNC: 14 G/DL
IMM GRANULOCYTES NFR BLD AUTO: 0.2 %
LDH SERPL-CCNC: 166 U/L
LYMPHOCYTES # BLD AUTO: 1.34 K/UL
LYMPHOCYTES NFR BLD AUTO: 31 %
MAN DIFF?: NORMAL
MCHC RBC-ENTMCNC: 30.4 PG
MCHC RBC-ENTMCNC: 34.5 G/DL
MCV RBC AUTO: 88.1 FL
MONOCYTES # BLD AUTO: 0.55 K/UL
MONOCYTES NFR BLD AUTO: 12.7 %
NEUTROPHILS # BLD AUTO: 2.11 K/UL
NEUTROPHILS NFR BLD AUTO: 48.9 %
PLATELET # BLD AUTO: 258 K/UL
POTASSIUM SERPL-SCNC: 4.6 MMOL/L
PROT SERPL-MCNC: 6.4 G/DL
RBC # BLD: 4.61 M/UL
RBC # FLD: 13.1 %
SODIUM SERPL-SCNC: 140 MMOL/L
WBC # FLD AUTO: 4.32 K/UL

## 2025-04-07 LAB
INFLUENZA A RESULT: NOT DETECTED
INFLUENZA B RESULT: NOT DETECTED
RESP SYN VIRUS RESULT: NOT DETECTED
S PYO DNA THROAT QL NAA+PROBE: NOT DETECTED
SARS-COV-2 RESULT: NOT DETECTED

## 2025-04-08 ENCOUNTER — OUTPATIENT (OUTPATIENT)
Dept: OUTPATIENT SERVICES | Facility: HOSPITAL | Age: 30
LOS: 1 days | Discharge: HOME | End: 2025-04-08

## 2025-04-08 DIAGNOSIS — C92.10 CHRONIC MYELOID LEUKEMIA, BCR/ABL-POSITIVE, NOT HAVING ACHIEVED REMISSION: ICD-10-CM

## 2025-04-08 LAB — T(9;22)(ABL1,BCR)/CONTROL BLD/T: NORMAL

## 2025-04-09 ENCOUNTER — APPOINTMENT (OUTPATIENT)
Dept: HEMATOLOGY ONCOLOGY | Facility: CLINIC | Age: 30
End: 2025-04-09
Payer: COMMERCIAL

## 2025-04-09 DIAGNOSIS — C92.10 CHRONIC MYELOID LEUKEMIA, BCR/ABL-POSITIVE, NOT HAVING ACHIEVED REMISSION: ICD-10-CM

## 2025-04-09 PROCEDURE — 99214 OFFICE O/P EST MOD 30 MIN: CPT | Mod: 95

## 2025-06-09 ENCOUNTER — APPOINTMENT (OUTPATIENT)
Dept: FAMILY MEDICINE | Facility: CLINIC | Age: 30
End: 2025-06-09